# Patient Record
Sex: FEMALE | Race: ASIAN | Employment: FULL TIME | URBAN - METROPOLITAN AREA
[De-identification: names, ages, dates, MRNs, and addresses within clinical notes are randomized per-mention and may not be internally consistent; named-entity substitution may affect disease eponyms.]

---

## 2018-10-01 ENCOUNTER — HOSPITAL ENCOUNTER (OUTPATIENT)
Age: 38
Discharge: HOME | End: 2018-10-01

## 2018-10-01 LAB — RUBELLA: 23.4 IU/ML

## 2018-10-03 LAB
MUMPS IGG TITER: 1.42
VZV IGG SER QL IA: 1.66

## 2018-10-04 LAB — RUBEOLA IGG: > 300 AU/ML

## 2019-01-11 ENCOUNTER — OFFICE VISIT (OUTPATIENT)
Dept: FAMILY MEDICINE CLINIC | Age: 39
End: 2019-01-11
Payer: COMMERCIAL

## 2019-01-11 VITALS
TEMPERATURE: 98 F | RESPIRATION RATE: 12 BRPM | BODY MASS INDEX: 21.71 KG/M2 | WEIGHT: 115 LBS | HEIGHT: 61 IN | SYSTOLIC BLOOD PRESSURE: 122 MMHG | DIASTOLIC BLOOD PRESSURE: 73 MMHG | HEART RATE: 71 BPM

## 2019-01-11 DIAGNOSIS — Z13.220 SCREENING CHOLESTEROL LEVEL: ICD-10-CM

## 2019-01-11 DIAGNOSIS — Z13.1 SCREENING FOR DIABETES MELLITUS: ICD-10-CM

## 2019-01-11 DIAGNOSIS — Z00.00 WELLNESS EXAMINATION: Primary | ICD-10-CM

## 2019-01-11 DIAGNOSIS — Z13.0 SCREENING, ANEMIA, DEFICIENCY, IRON: ICD-10-CM

## 2019-01-11 DIAGNOSIS — Z83.42 FAMILY HISTORY OF HYPERCHOLESTEROLEMIA: ICD-10-CM

## 2019-01-11 PROCEDURE — 90732 PPSV23 VACC 2 YRS+ SUBQ/IM: CPT | Performed by: FAMILY MEDICINE

## 2019-01-11 PROCEDURE — 90471 IMMUNIZATION ADMIN: CPT | Performed by: FAMILY MEDICINE

## 2019-01-11 PROCEDURE — 99385 PREV VISIT NEW AGE 18-39: CPT | Performed by: FAMILY MEDICINE

## 2019-01-11 RX ORDER — ADAPALENE AND BENZOYL PEROXIDE 3; 25 MG/G; MG/G
GEL TOPICAL DAILY
COMMUNITY

## 2019-01-11 RX ORDER — ALBUTEROL SULFATE 90 UG/1
2 AEROSOL, METERED RESPIRATORY (INHALATION) EVERY 6 HOURS PRN
COMMUNITY
End: 2019-01-11 | Stop reason: SDUPTHER

## 2019-01-11 RX ORDER — MINOCYCLINE HYDROCHLORIDE 100 MG/1
100 CAPSULE ORAL DAILY
COMMUNITY
End: 2019-11-17

## 2019-01-11 RX ORDER — CLINDAMYCIN PHOSPHATE 10 MG/G
GEL TOPICAL DAILY
COMMUNITY

## 2019-01-11 RX ORDER — ALBUTEROL SULFATE 90 UG/1
2 AEROSOL, METERED RESPIRATORY (INHALATION) EVERY 6 HOURS PRN
Qty: 3 INHALER | Refills: 1 | Status: SHIPPED | OUTPATIENT
Start: 2019-01-11 | End: 2021-08-17 | Stop reason: SDUPTHER

## 2019-01-11 ASSESSMENT — PATIENT HEALTH QUESTIONNAIRE - PHQ9
1. LITTLE INTEREST OR PLEASURE IN DOING THINGS: 0
SUM OF ALL RESPONSES TO PHQ QUESTIONS 1-9: 0
SUM OF ALL RESPONSES TO PHQ QUESTIONS 1-9: 0
2. FEELING DOWN, DEPRESSED OR HOPELESS: 0
SUM OF ALL RESPONSES TO PHQ9 QUESTIONS 1 & 2: 0

## 2019-01-14 ENCOUNTER — HOSPITAL ENCOUNTER (OUTPATIENT)
Age: 39
Discharge: HOME | End: 2019-01-14
Payer: COMMERCIAL

## 2019-01-14 LAB
BASOPHILS # BLD: 1.1 %
BASOPHILS ABSOLUTE: 0.1 THOU/MM3 (ref 0–0.1)
CHOLESTEROL, TOTAL: 172 MG/DL (ref 100–199)
EOSINOPHIL # BLD: 2.1 %
EOSINOPHILS ABSOLUTE: 0.1 THOU/MM3 (ref 0–0.4)
ERYTHROCYTE [DISTWIDTH] IN BLOOD BY AUTOMATED COUNT: 12.7 % (ref 11.5–14.5)
ERYTHROCYTE [DISTWIDTH] IN BLOOD BY AUTOMATED COUNT: 41.2 FL (ref 35–45)
GLUCOSE BLD-MCNC: 92 MG/DL (ref 70–108)
HCT VFR BLD CALC: 42.2 % (ref 37–47)
HDLC SERPL-MCNC: 56 MG/DL
HEMOGLOBIN: 13.4 GM/DL (ref 12–16)
IMMATURE GRANS (ABS): 0.01 THOU/MM3 (ref 0–0.07)
IMMATURE GRANULOCYTES: 0.2 %
LDL CHOLESTEROL CALCULATED: 103 MG/DL
LYMPHOCYTES # BLD: 26.9 %
LYMPHOCYTES ABSOLUTE: 1.5 THOU/MM3 (ref 1–4.8)
MCH RBC QN AUTO: 28.2 PG (ref 26–33)
MCHC RBC AUTO-ENTMCNC: 31.8 GM/DL (ref 32.2–35.5)
MCV RBC AUTO: 88.8 FL (ref 81–99)
MONOCYTES # BLD: 9.6 %
MONOCYTES ABSOLUTE: 0.5 THOU/MM3 (ref 0.4–1.3)
NUCLEATED RED BLOOD CELLS: 0 /100 WBC
PLATELET # BLD: 283 THOU/MM3 (ref 130–400)
PMV BLD AUTO: 9.7 FL (ref 9.4–12.4)
RBC # BLD: 4.75 MILL/MM3 (ref 4.2–5.4)
SEG NEUTROPHILS: 60.1 %
SEGMENTED NEUTROPHILS ABSOLUTE COUNT: 3.4 THOU/MM3 (ref 1.8–7.7)
TRIGL SERPL-MCNC: 63 MG/DL (ref 0–199)
WBC # BLD: 5.6 THOU/MM3 (ref 4.8–10.8)

## 2019-01-14 PROCEDURE — 36415 COLL VENOUS BLD VENIPUNCTURE: CPT

## 2019-01-14 PROCEDURE — 80061 LIPID PANEL: CPT

## 2019-01-14 PROCEDURE — 82947 ASSAY GLUCOSE BLOOD QUANT: CPT

## 2019-01-14 PROCEDURE — 85025 COMPLETE CBC W/AUTO DIFF WBC: CPT

## 2019-01-15 ENCOUNTER — TELEPHONE (OUTPATIENT)
Dept: FAMILY MEDICINE CLINIC | Age: 39
End: 2019-01-15

## 2019-01-22 ENCOUNTER — OFFICE VISIT (OUTPATIENT)
Dept: FAMILY MEDICINE CLINIC | Age: 39
End: 2019-01-22
Payer: COMMERCIAL

## 2019-01-22 VITALS
HEIGHT: 61 IN | WEIGHT: 113 LBS | RESPIRATION RATE: 14 BRPM | BODY MASS INDEX: 21.34 KG/M2 | HEART RATE: 66 BPM | TEMPERATURE: 98.8 F | SYSTOLIC BLOOD PRESSURE: 105 MMHG | DIASTOLIC BLOOD PRESSURE: 72 MMHG

## 2019-01-22 DIAGNOSIS — J02.9 ACUTE VIRAL PHARYNGITIS: Primary | ICD-10-CM

## 2019-01-22 DIAGNOSIS — R05.9 COUGH: ICD-10-CM

## 2019-01-22 PROCEDURE — 99213 OFFICE O/P EST LOW 20 MIN: CPT | Performed by: NURSE PRACTITIONER

## 2019-11-17 ENCOUNTER — NURSE TRIAGE (OUTPATIENT)
Dept: OTHER | Facility: CLINIC | Age: 39
End: 2019-11-17

## 2019-11-17 ENCOUNTER — HOSPITAL ENCOUNTER (EMERGENCY)
Age: 39
Discharge: HOME | End: 2019-11-17
Attending: EMERGENCY MEDICINE
Payer: COMMERCIAL

## 2019-11-17 VITALS
TEMPERATURE: 98.8 F | HEIGHT: 61 IN | WEIGHT: 110 LBS | SYSTOLIC BLOOD PRESSURE: 121 MMHG | OXYGEN SATURATION: 100 % | HEART RATE: 68 BPM | DIASTOLIC BLOOD PRESSURE: 66 MMHG | BODY MASS INDEX: 20.77 KG/M2 | RESPIRATION RATE: 16 BRPM

## 2019-11-17 DIAGNOSIS — N30.01 ACUTE CYSTITIS WITH HEMATURIA: Primary | ICD-10-CM

## 2019-11-17 LAB
BILIRUBIN URINE: NEGATIVE
BLOOD, URINE: ABNORMAL
CHARACTER, URINE: CLEAR
COLOR: YELLOW
GLUCOSE, URINE: NEGATIVE MG/DL
KETONES, URINE: NEGATIVE
LEUKOCYTES, UA: ABNORMAL
NITRATE, UA: NEGATIVE
PH UA: 5.5 (ref 5–9)
PROTEIN UA: NEGATIVE MG/DL
REFLEX TO URINE C & S: ABNORMAL
SPECIFIC GRAVITY UA: 1.01 (ref 1–1.03)
UROBILINOGEN, URINE: 0.2 EU/DL (ref 0–1)

## 2019-11-17 PROCEDURE — 81003 URINALYSIS AUTO W/O SCOPE: CPT

## 2019-11-17 PROCEDURE — 99213 OFFICE O/P EST LOW 20 MIN: CPT | Performed by: EMERGENCY MEDICINE

## 2019-11-17 PROCEDURE — 99214 OFFICE O/P EST MOD 30 MIN: CPT

## 2019-11-17 PROCEDURE — 87086 URINE CULTURE/COLONY COUNT: CPT

## 2019-11-17 RX ORDER — CIPROFLOXACIN 500 MG/1
500 TABLET, FILM COATED ORAL 2 TIMES DAILY
Qty: 10 TABLET | Refills: 0 | Status: SHIPPED | OUTPATIENT
Start: 2019-11-17 | End: 2019-11-22

## 2019-11-17 ASSESSMENT — ENCOUNTER SYMPTOMS
CHOKING: 0
TROUBLE SWALLOWING: 0
DIARRHEA: 0
WHEEZING: 0
COUGH: 0
EYE DISCHARGE: 0
NAUSEA: 0
BLOOD IN STOOL: 0
VOICE CHANGE: 0
STRIDOR: 0
EYE REDNESS: 0
SORE THROAT: 0
VOMITING: 0
ABDOMINAL PAIN: 1
EYE PAIN: 0
SINUS PRESSURE: 0
CONSTIPATION: 0
BACK PAIN: 0
FACIAL SWELLING: 0
SHORTNESS OF BREATH: 0

## 2019-11-17 ASSESSMENT — PAIN DESCRIPTION - LOCATION: LOCATION: ABDOMEN

## 2019-11-17 ASSESSMENT — PAIN SCALES - GENERAL: PAINLEVEL_OUTOF10: 1

## 2019-11-18 LAB
ORGANISM: ABNORMAL
URINE CULTURE REFLEX: ABNORMAL

## 2019-11-25 ENCOUNTER — TELEPHONE (OUTPATIENT)
Dept: FAMILY MEDICINE CLINIC | Age: 39
End: 2019-11-25

## 2019-12-02 ENCOUNTER — NURSE ONLY (OUTPATIENT)
Dept: FAMILY MEDICINE CLINIC | Age: 39
End: 2019-12-02
Payer: COMMERCIAL

## 2019-12-02 DIAGNOSIS — Z23 NEED FOR INFLUENZA VACCINATION: Primary | ICD-10-CM

## 2019-12-02 PROCEDURE — 90688 IIV4 VACCINE SPLT 0.5 ML IM: CPT | Performed by: FAMILY MEDICINE

## 2019-12-02 PROCEDURE — 90471 IMMUNIZATION ADMIN: CPT | Performed by: FAMILY MEDICINE

## 2020-02-13 ENCOUNTER — OFFICE VISIT (OUTPATIENT)
Dept: FAMILY MEDICINE CLINIC | Age: 40
End: 2020-02-13
Payer: COMMERCIAL

## 2020-02-13 VITALS
HEART RATE: 65 BPM | TEMPERATURE: 98 F | DIASTOLIC BLOOD PRESSURE: 78 MMHG | WEIGHT: 114.6 LBS | HEIGHT: 61 IN | BODY MASS INDEX: 21.64 KG/M2 | SYSTOLIC BLOOD PRESSURE: 115 MMHG

## 2020-02-13 PROCEDURE — 99395 PREV VISIT EST AGE 18-39: CPT | Performed by: FAMILY MEDICINE

## 2020-02-13 RX ORDER — THIAMINE MONONITRATE, RIBOFLAVIN, PYRIDOXINE HYDROCHLORIDE, CYANOCOBALAMIN, ASCORBIC ACID, NIACIN, FOLIC ACID, FERROUS FUMARATE, CALCIUM CARBONATE, CHOLECALCIFEROL, VITAMIN E ACETATE, POTASSIUM IODIDE, ZINC OXIDE, CHOLINE BITARTRATE, AND DOCONEXENT
1 KIT DAILY
Qty: 90 EACH | Refills: 2 | Status: SHIPPED | OUTPATIENT
Start: 2020-02-13 | End: 2020-03-13

## 2020-02-13 SDOH — ECONOMIC STABILITY: TRANSPORTATION INSECURITY
IN THE PAST 12 MONTHS, HAS THE LACK OF TRANSPORTATION KEPT YOU FROM MEDICAL APPOINTMENTS OR FROM GETTING MEDICATIONS?: NO

## 2020-02-13 SDOH — ECONOMIC STABILITY: TRANSPORTATION INSECURITY
IN THE PAST 12 MONTHS, HAS LACK OF TRANSPORTATION KEPT YOU FROM MEETINGS, WORK, OR FROM GETTING THINGS NEEDED FOR DAILY LIVING?: NO

## 2020-02-13 SDOH — ECONOMIC STABILITY: FOOD INSECURITY: WITHIN THE PAST 12 MONTHS, YOU WORRIED THAT YOUR FOOD WOULD RUN OUT BEFORE YOU GOT MONEY TO BUY MORE.: NEVER TRUE

## 2020-02-13 SDOH — ECONOMIC STABILITY: FOOD INSECURITY: WITHIN THE PAST 12 MONTHS, THE FOOD YOU BOUGHT JUST DIDN'T LAST AND YOU DIDN'T HAVE MONEY TO GET MORE.: NEVER TRUE

## 2020-02-13 SDOH — ECONOMIC STABILITY: INCOME INSECURITY: HOW HARD IS IT FOR YOU TO PAY FOR THE VERY BASICS LIKE FOOD, HOUSING, MEDICAL CARE, AND HEATING?: NOT HARD AT ALL

## 2020-02-13 ASSESSMENT — PATIENT HEALTH QUESTIONNAIRE - PHQ9
SUM OF ALL RESPONSES TO PHQ QUESTIONS 1-9: 0
2. FEELING DOWN, DEPRESSED OR HOPELESS: 0
1. LITTLE INTEREST OR PLEASURE IN DOING THINGS: 0
SUM OF ALL RESPONSES TO PHQ9 QUESTIONS 1 & 2: 0
SUM OF ALL RESPONSES TO PHQ QUESTIONS 1-9: 0

## 2020-02-13 NOTE — PROGRESS NOTES
[address]  [address], [address] [facility]  Ph:   [phone]  Fax: [phone]    Provider:  Dr. Angus Garcia Visit    Patient:  Reyes Salaam  YOB: 1980      Visit Date:  2/13/2020    Subjective:  Review of Systems   All other systems reviewed and are negative. Health Habits: With regard to her health habits, she eats 3 meals and 0 snacks per day. She does not exercise regularly:  She does not take over the counter vitamins. She never had a blood transfusion or tattoo. She wears seatbelts while riding a car. She does not text or talk on the phone while driving. She performs all of her ADL's without problem. She is independent, she cooks, drives, bathes, and gets dressed without assistance. She is . She has 1 child. She does work. 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 She is happy with her life. She rates her stress level a 2 in a scale 1-10. Health Maintenance   Topic Date Due    Varicella vaccine (1 of 2 - 2-dose childhood series) 12/25/1981    DTaP/Tdap/Td vaccine (1 - Tdap) 12/25/1991    HIV screen  12/25/1995    Cervical cancer screen  12/25/2001    Shingles Vaccine (1 of 2) 12/25/2030    Flu vaccine  Completed    Hepatitis A vaccine  Aged Out    Hepatitis B vaccine  Aged Out    Hib vaccine  Aged Out    Meningococcal (ACWY) vaccine  Aged Out    Pneumococcal 0-64 years Vaccine  Aged Out       She  reports that she has never smoked. She has never used smokeless tobacco. She reports that she does not drink alcohol or use drugs. .   Her last pap smear was 9  years and it was normal. Her last menstrual cycle was 2weeks ago. She is sexually active. She has had the same sexual partner for the last 13 years. She does not perform regular breast self exams. [address]  [address], [address] [facility]  Ph:   [phone]  Fax: [phone]    Provider:  Dr. Margaret Chacko     HPI:  Hugo Mcknight Kenzie Rivera is a 44y.o. year old female, who comes today for an annual wellness visit. Past Medical History:   Diagnosis Date    Acne teeanger    Asthma     Dx as a child        History reviewed. No pertinent surgical history.     Family History   Problem Relation Age of Onset    Elevated Lipids Mother 48    High Blood Pressure Mother 48    High Blood Pressure Father 48    Heart Attack Father 47        CAD    Elevated Lipids Father 48    Asthma Father     Asthma Sister         dx as a chlid   [facility] Asthma Brother         dx as chlid    Seizures Maternal Grandfather     High Blood Pressure Paternal Grandmother     Breast Cancer Paternal Aunt 52       Social History     Socioeconomic History    Marital status:      Spouse name: Emanual    Number of children: 1    Years of education: 16    Highest education level: Not on file   Occupational History    Occupation: physician   Social Needs    Financial resource strain: Not hard at all   [place] insecurity:     Worry: Never true     Inability: Never true    Transportation needs:     Medical: No     Non-medical: No   Tobacco Use    Smoking status: Never Smoker    Smokeless tobacco: Never Used   Substance and Sexual Activity    Alcohol use: No    Drug use: No    Sexual activity: Yes     Partners: Male     Comment: - 12 years    Lifestyle    Physical activity:     Days per week: Not on file     Minutes per session: Not on file    Stress: Not on file   Relationships    Social connections:     Talks on phone: Not on file     Gets together: Not on file     Attends Jain service: Not on file     Active member of club or organization: Not on file     Attends meetings of clubs or organizations: Not on file     Relationship status: Not on file    Intimate partner violence:     Fear of current or ex partner: Not on file     Emotionally abused: Not on file     Physically abused: Not on file     Forced sexual activity: Not on file Other Topics Concern    Not on file   Social History Narrative    Not on file       No Known Allergies    Ma has a current medication list which includes the following prescription(s): prenatal + dha, clindamycin, adapalene-benzoyl peroxide, and albuterol sulfate hfa. Objective:  Blood pressure 115/78, pulse 65, temperature 98 °F (36.7 °C), temperature source Oral, height 5' 1\" (1.549 m), weight 114 lb 9.6 oz (52 kg). Physical Examination:   General Appearance - alert, well appearing, and in no distress and oriented to person, place, and time  Eyes - pupils equal and reactive, extraocular eye movements intact, sclera anicteric  Ears - bilateral TM's and external ear canals normal, hearing grossly normal bilaterally  Nose - normal and patent, no erythema, discharge or polyps  Mouth - mucous membranes moist, pharynx normal without lesions  Neck - supple, no significant adenopathy  Lymphatics - no palpable lymphadenopathy  Chest - clear to auscultation, no wheezes, rales or rhonchi, symmetric air entry  Heart - normal rate, regular rhythm, normal S1, S2, no murmurs, rubs, clicks or gallops  Abdomen - soft, nontender, nondistended, no masses or organomegaly  Neurological -  oriented, normal speech, no focal findings or movement disorder noted  Extremities - peripheral pulses normal, no pedal edema, no clubbing or cyanosis  Skin - normal coloration and turgor, no rashes, no suspicious skin lesions noted    Assessment:   Diagnosis Orders   1. Wellness examination     2. Screening, lipid  Lipid Panel   3. Screening for diabetes mellitus  Glucose, Fasting   4. Screening for cervical cancer  Pj Avilez MD, Obstetrics & Gynecology, [facility]   5. Desire for pregnancy  Pj Avilez MD, Obstetrics & Gynecology, [address]:    Return in about 1 year (around 2/13/2021) for Wellness Visit. .    Counseling was provided today regarding the following topics:  Healthy eating habits and snacks, talking or

## 2020-02-21 ENCOUNTER — NURSE ONLY (OUTPATIENT)
Dept: LAB | Age: 40
End: 2020-02-21

## 2020-02-21 LAB
CHOLESTEROL, TOTAL: 179 MG/DL (ref 100–199)
GLUCOSE FASTING: 99 MG/DL (ref 70–108)
HDLC SERPL-MCNC: 67 MG/DL
LDL CHOLESTEROL CALCULATED: 103 MG/DL
TRIGL SERPL-MCNC: 45 MG/DL (ref 0–199)

## 2020-03-13 ENCOUNTER — NURSE ONLY (OUTPATIENT)
Dept: LAB | Age: 40
End: 2020-03-13

## 2020-03-13 ENCOUNTER — OFFICE VISIT (OUTPATIENT)
Dept: FAMILY MEDICINE CLINIC | Age: 40
End: 2020-03-13
Payer: COMMERCIAL

## 2020-03-13 VITALS
SYSTOLIC BLOOD PRESSURE: 97 MMHG | BODY MASS INDEX: 21.41 KG/M2 | HEART RATE: 78 BPM | TEMPERATURE: 98.1 F | HEIGHT: 61 IN | DIASTOLIC BLOOD PRESSURE: 71 MMHG | WEIGHT: 113.4 LBS | RESPIRATION RATE: 12 BRPM

## 2020-03-13 LAB
ALBUMIN SERPL-MCNC: 4.3 G/DL (ref 3.5–5.1)
ALP BLD-CCNC: 52 U/L (ref 38–126)
ALT SERPL-CCNC: 14 U/L (ref 11–66)
ANION GAP SERPL CALCULATED.3IONS-SCNC: 13 MEQ/L (ref 8–16)
AST SERPL-CCNC: 17 U/L (ref 5–40)
BACTERIA: ABNORMAL
BASOPHILS # BLD: 1.2 %
BASOPHILS ABSOLUTE: 0.1 THOU/MM3 (ref 0–0.1)
BILIRUB SERPL-MCNC: 0.2 MG/DL (ref 0.3–1.2)
BILIRUBIN URINE: NEGATIVE
BLOOD, URINE: NEGATIVE
BUN BLDV-MCNC: 14 MG/DL (ref 7–22)
CALCIUM SERPL-MCNC: 9.6 MG/DL (ref 8.5–10.5)
CASTS: ABNORMAL /LPF
CASTS: ABNORMAL /LPF
CHARACTER, URINE: CLEAR
CHLORIDE BLD-SCNC: 101 MEQ/L (ref 98–111)
CO2: 25 MEQ/L (ref 23–33)
COLOR: YELLOW
CREAT SERPL-MCNC: 0.6 MG/DL (ref 0.4–1.2)
CRYSTALS: ABNORMAL
EOSINOPHIL # BLD: 8.9 %
EOSINOPHILS ABSOLUTE: 0.7 THOU/MM3 (ref 0–0.4)
EPITHELIAL CELLS, UA: ABNORMAL /HPF
ERYTHROCYTE [DISTWIDTH] IN BLOOD BY AUTOMATED COUNT: 13.2 % (ref 11.5–14.5)
ERYTHROCYTE [DISTWIDTH] IN BLOOD BY AUTOMATED COUNT: 43.8 FL (ref 35–45)
GFR SERPL CREATININE-BSD FRML MDRD: > 90 ML/MIN/1.73M2
GLUCOSE BLD-MCNC: 100 MG/DL (ref 70–108)
GLUCOSE, URINE: NEGATIVE MG/DL
HCT VFR BLD CALC: 44 % (ref 37–47)
HEMOGLOBIN: 14 GM/DL (ref 12–16)
IMMATURE GRANS (ABS): 0.02 THOU/MM3 (ref 0–0.07)
IMMATURE GRANULOCYTES: 0.3 %
KETONES, URINE: ABNORMAL
LEUKOCYTE ESTERASE, URINE: NEGATIVE
LYMPHOCYTES # BLD: 20.2 %
LYMPHOCYTES ABSOLUTE: 1.5 THOU/MM3 (ref 1–4.8)
MCH RBC QN AUTO: 28.8 PG (ref 26–33)
MCHC RBC AUTO-ENTMCNC: 31.8 GM/DL (ref 32.2–35.5)
MCV RBC AUTO: 90.5 FL (ref 81–99)
MISCELLANEOUS LAB TEST RESULT: ABNORMAL
MONOCYTES # BLD: 6.2 %
MONOCYTES ABSOLUTE: 0.5 THOU/MM3 (ref 0.4–1.3)
NITRITE, URINE: NEGATIVE
NUCLEATED RED BLOOD CELLS: 0 /100 WBC
PH UA: 6.5 (ref 5–9)
PLATELET # BLD: 267 THOU/MM3 (ref 130–400)
PMV BLD AUTO: 9.7 FL (ref 9.4–12.4)
POTASSIUM SERPL-SCNC: 4.2 MEQ/L (ref 3.5–5.2)
PROTEIN UA: NEGATIVE MG/DL
RBC # BLD: 4.86 MILL/MM3 (ref 4.2–5.4)
RBC URINE: ABNORMAL /HPF
RENAL EPITHELIAL, UA: ABNORMAL
SEG NEUTROPHILS: 63.2 %
SEGMENTED NEUTROPHILS ABSOLUTE COUNT: 4.8 THOU/MM3 (ref 1.8–7.7)
SODIUM BLD-SCNC: 139 MEQ/L (ref 135–145)
SPECIFIC GRAVITY UA: 1.03 (ref 1–1.03)
TOTAL PROTEIN: 7.8 G/DL (ref 6.1–8)
UROBILINOGEN, URINE: 0.2 EU/DL (ref 0–1)
WBC # BLD: 7.6 THOU/MM3 (ref 4.8–10.8)
WBC UA: ABNORMAL /HPF
YEAST: ABNORMAL

## 2020-03-13 PROCEDURE — 99214 OFFICE O/P EST MOD 30 MIN: CPT | Performed by: FAMILY MEDICINE

## 2020-03-13 RX ORDER — OMEPRAZOLE 20 MG/1
20 CAPSULE, DELAYED RELEASE ORAL 2 TIMES DAILY
Qty: 30 CAPSULE | Refills: 3 | Status: SHIPPED | OUTPATIENT
Start: 2020-03-13

## 2020-03-13 NOTE — PROGRESS NOTES
disturbances  ENT ROS: negative for - headaches, nasal congestion or nasal discharge  Allergy and Immunology ROS: negative for - seasonal allergies  Hematological and Lymphatic ROS: negative for - bruising  Endocrine ROS: negative for - malaise/lethargy, polydipsia/polyuria or temperature intolerance  Respiratory ROS: negative for - cough,  shortness of breath or wheezing  Cardiovascular ROS: negative for - chest pain or edema  Gastrointestinal ROS: negative for - abdominal pain, constipation, diarrhea or nausea/vomiting  Musculoskeletal ROS: negative for - joint pain or muscle pain  Neurological ROS: negative for - dizziness  Dermatological ROS: negative for - rash    Physical Examination:  BP 97/71 (Site: Left Upper Arm, Position: Sitting, Cuff Size: Medium Adult)   Pulse 78   Temp 98.1 °F (36.7 °C) (Oral)   Resp 12   Ht 5' 1\" (1.549 m)   Wt 113 lb 6.4 oz (51.4 kg)   BMI 21.43 kg/m²     General-  Alert and oriented x 3, NAD  HEENT: NC, AT  Ears: Normal tympanic membranes bilaterally  Nose: patent, no lesions  Mouth: no lesions, moist mucosas  Neck - supple, no significant adenopathy  Chest - clear to auscultation, no wheezes, rales or rhonchi, symmetric air entry  Heart - normal rate, regular rhythm, normal S1, S2, no murmurs, rubs, clicks or gallops  Abdomen - soft, mild tenderness in the periumbilical, left upper quadrand area. Nondistended, no masses or organomegaly      Impression:   Diagnosis Orders   1. Other acute gastritis without hemorrhage  CBC Auto Differential    Comprehensive Metabolic Panel   2. Other microscopic hematuria  Urinalysis with Microscopic       Plan:    Continue Prilosec 40 mg PO daily  Do blood test  Stop Prilosec in 4-6 weeks and follow up 2 weeks later. If pain still present will do stool H pylori Ag.   Repeat urine with microscopy      Orders Placed This Encounter   Procedures    CBC Auto Differential     Standing Status:   Future     Number of Occurrences:   1     Standing Expiration Date:   3/13/2021    Comprehensive Metabolic Panel     Standing Status:   Future     Number of Occurrences:   1     Standing Expiration Date:   3/13/2021    Urinalysis with Microscopic     Standing Status:   Future     Number of Occurrences:   1     Standing Expiration Date:   3/13/2021     Order Specific Question:   SPECIFY(EX-CATH,MIDSTREAM,CYSTO,ETC)? Answer:   midstream     Orders Placed This Encounter   Medications    omeprazole (PRILOSEC) 20 MG delayed release capsule     Sig: Take 1 capsule by mouth 2 times daily     Dispense:  30 capsule     Refill:  3       Follow Up:  Return in about 6 weeks (around 4/24/2020).     Zeinab Kitchen MD

## 2020-03-16 ENCOUNTER — TELEPHONE (OUTPATIENT)
Dept: FAMILY MEDICINE CLINIC | Age: 40
End: 2020-03-16

## 2020-04-04 ENCOUNTER — NURSE TRIAGE (OUTPATIENT)
Dept: OTHER | Facility: CLINIC | Age: 40
End: 2020-04-04

## 2020-04-05 ENCOUNTER — HOSPITAL ENCOUNTER (EMERGENCY)
Age: 40
Discharge: HOME | End: 2020-04-05
Attending: EMERGENCY MEDICINE
Payer: COMMERCIAL

## 2020-04-05 VITALS
TEMPERATURE: 98.4 F | BODY MASS INDEX: 21.71 KG/M2 | OXYGEN SATURATION: 100 % | WEIGHT: 115 LBS | SYSTOLIC BLOOD PRESSURE: 124 MMHG | HEIGHT: 61 IN | DIASTOLIC BLOOD PRESSURE: 79 MMHG | HEART RATE: 70 BPM | RESPIRATION RATE: 15 BRPM

## 2020-04-05 LAB
FLU A ANTIGEN: NEGATIVE
FLU B ANTIGEN: NEGATIVE
GROUP A STREP CULTURE, REFLEX: NEGATIVE
REFLEX THROAT C + S: NORMAL

## 2020-04-05 PROCEDURE — 87880 STREP A ASSAY W/OPTIC: CPT

## 2020-04-05 PROCEDURE — 87804 INFLUENZA ASSAY W/OPTIC: CPT

## 2020-04-05 PROCEDURE — 6370000000 HC RX 637 (ALT 250 FOR IP): Performed by: EMERGENCY MEDICINE

## 2020-04-05 PROCEDURE — 87070 CULTURE OTHR SPECIMN AEROBIC: CPT

## 2020-04-05 PROCEDURE — 99283 EMERGENCY DEPT VISIT LOW MDM: CPT

## 2020-04-05 RX ORDER — ACETAMINOPHEN 500 MG
1000 TABLET ORAL ONCE
Status: COMPLETED | OUTPATIENT
Start: 2020-04-05 | End: 2020-04-05

## 2020-04-05 RX ORDER — ACETAMINOPHEN 500 MG
1000 TABLET ORAL EVERY 6 HOURS PRN
Qty: 20 TABLET | Refills: 0 | Status: SHIPPED | OUTPATIENT
Start: 2020-04-05

## 2020-04-05 RX ADMIN — Medication 3.3 MG: at 12:39

## 2020-04-05 RX ADMIN — ACETAMINOPHEN 1000 MG: 500 TABLET ORAL at 12:39

## 2020-04-05 ASSESSMENT — ENCOUNTER SYMPTOMS
SINUS PAIN: 0
BACK PAIN: 0
SINUS PRESSURE: 0
SHORTNESS OF BREATH: 0
EYE PAIN: 0
BLOOD IN STOOL: 0
COUGH: 0
SORE THROAT: 1
ABDOMINAL PAIN: 0
CONSTIPATION: 0
RECTAL PAIN: 0
NAUSEA: 0
DIARRHEA: 0
CHEST TIGHTNESS: 0

## 2020-04-05 ASSESSMENT — PAIN DESCRIPTION - PAIN TYPE: TYPE: ACUTE PAIN

## 2020-04-05 ASSESSMENT — PAIN DESCRIPTION - DESCRIPTORS: DESCRIPTORS: ACHING

## 2020-04-05 ASSESSMENT — PAIN SCALES - GENERAL
PAINLEVEL_OUTOF10: 5
PAINLEVEL_OUTOF10: 5

## 2020-04-05 ASSESSMENT — PAIN DESCRIPTION - LOCATION: LOCATION: GENERALIZED

## 2020-04-05 ASSESSMENT — PAIN DESCRIPTION - FREQUENCY: FREQUENCY: CONTINUOUS

## 2020-04-05 NOTE — ED PROVIDER NOTES
negative. PAST MEDICAL AND SURGICAL HISTORY     Past Medical History:   Diagnosis Date    Acne teeanger    Asthma     Dx as a child      History reviewed. No pertinent surgical history. MEDICATIONS     Current Facility-Administered Medications:     acetaminophen (TYLENOL) tablet 1,000 mg, 1,000 mg, Oral, Once, Armen Agosto MD    menthol [facility]) lozenge 3.3 mg, 1 lozenge, Mouth/Throat, Once, Armen Agosto MD    Current Outpatient Medications:     acetaminophen (TYLENOL) 500 MG tablet, Take 2 tablets by mouth every 6 hours as needed for Pain, Disp: 20 tablet, Rfl: 0    benzocaine-menthol (CEPACOL SORE THROAT) 15-3.6 MG lozenge, Take 1 lozenge by mouth every 2 hours as needed for Sore Throat, Disp: 168 lozenge, Rfl: 0    omeprazole (PRILOSEC) 20 MG delayed release capsule, Take 1 capsule by mouth 2 times daily, Disp: 30 capsule, Rfl: 3    clindamycin (CLINDAGEL) 1 % gel, Apply topically daily Apply topically 2 times daily. 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

## 2020-04-05 NOTE — ED NOTES
Presents to ER for complaints of fever, body aches and diarrhea. Pt states she started to have body aches that began one week ago, states the diarrhea began 4 days ago and the fever started last night. States she has taken tylenol and advil with relief of fever but no relief of body aches. States the body aches have only began getting worse, rates pain a 5 out of 10 in severity. 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 Pt states she has a history of asthma, denies taking any home medications. Alert and oriented, respirations unlabored. Dr. Wiley Espinoza at bedside for patient evaluation.      Soledad Pruett RN  04/05/20 5797

## 2020-04-06 ENCOUNTER — TELEPHONE (OUTPATIENT)
Dept: FAMILY MEDICINE CLINIC | Age: 40
End: 2020-04-06

## 2020-04-07 LAB — THROAT/NOSE CULTURE: NORMAL

## 2020-04-13 ENCOUNTER — OFFICE VISIT (OUTPATIENT)
Dept: PRIMARY CARE CLINIC | Age: 40
End: 2020-04-13
Payer: COMMERCIAL

## 2020-04-13 VITALS
DIASTOLIC BLOOD PRESSURE: 72 MMHG | SYSTOLIC BLOOD PRESSURE: 129 MMHG | HEART RATE: 97 BPM | TEMPERATURE: 99.4 F | RESPIRATION RATE: 14 BRPM | OXYGEN SATURATION: 99 %

## 2020-04-13 PROCEDURE — 99213 OFFICE O/P EST LOW 20 MIN: CPT | Performed by: NURSE PRACTITIONER

## 2020-04-13 ASSESSMENT — ENCOUNTER SYMPTOMS
SORE THROAT: 1
GASTROINTESTINAL NEGATIVE: 1
RESPIRATORY NEGATIVE: 1
EYES NEGATIVE: 1

## 2020-04-13 NOTE — PROGRESS NOTES
Evelio Wilkerson is a 44 y.o. female whopresents today for :  Chief Complaint   Patient presents with    Diarrhea     diarrhea since 3/29/20    Pharyngitis     sore throat started 4/1/20    Fever     felt feverish started last Sat     Generalized Body Aches     all over body aches        HPI:     HPI  As above. 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 Before returning to work she needs negative covid testing. For the most part she is feeling better. Still slight sore throat. There is no problem list on file for this patient. Past Medical History:   Diagnosis Date    Acne teeanger    Asthma     Dx as a child       No past surgical history on file. Family History   Problem Relation Age of Onset    Elevated Lipids Mother 48    High Blood Pressure Mother 48    High Blood Pressure Father 48    Heart Attack Father 47        CAD    Elevated Lipids Father 48    Asthma Father     Asthma Sister         dx as a chlid    Asthma Brother         dx as chlid    Seizures Maternal Grandfather     High Blood Pressure Paternal Grandmother     Breast Cancer Paternal Aunt 52     Social History     Tobacco Use    Smoking status: Never Smoker    Smokeless tobacco: Never Used   Substance Use Topics    Alcohol use: No      Current Outpatient Medications   Medication Sig Dispense Refill    acetaminophen (TYLENOL) 500 MG tablet Take 2 tablets by mouth every 6 hours as needed for Pain 20 tablet 0    omeprazole (PRILOSEC) 20 MG delayed release capsule Take 1 capsule by mouth 2 times daily 30 capsule 3    clindamycin (CLINDAGEL) 1 % gel Apply topically daily Apply topically 2 times daily.       Adapalene-Benzoyl Peroxide (EPIDUO FORTE) 0.3-2.5 % GEL Apply topically daily      albuterol sulfate  (90 Base) MCG/ACT inhaler Inhale 2 puffs into the lungs every 6 hours as needed for Wheezing 3 Inhaler 1     No current facility-administered medications for this visit. No Known Allergies  Health Maintenance   Topic Date Due    Varicella vaccine (1 of 2 - 2-dose childhood series) 12/25/1981    HIV screen  12/25/1995    DTaP/Tdap/Td vaccine (1 - Tdap) 12/25/1999    Cervical cancer screen  12/25/2001    Flu vaccine  Completed    Hepatitis A vaccine  Aged Out    Hepatitis B vaccine  Aged Out    Hib vaccine  Aged Out    Meningococcal (ACWY) vaccine  Aged Out    Pneumococcal 0-64 years Vaccine  Aged Out       Subjective:     Review of Systems   Constitutional: Negative. HENT: Positive for sore throat. Eyes: Negative. Respiratory: Negative. Cardiovascular: Negative. Gastrointestinal: Negative. Musculoskeletal: Negative. Skin: Negative. Neurological: Negative. Objective:     Vitals:    04/13/20 1306   BP: 129/72   Pulse: 97   Resp: 14   Temp: 99.4 °F (37.4 °C)   TempSrc: Oral   SpO2: 99%       Physical Exam  Constitutional:       Appearance: She is well-developed. HENT:      Head: Normocephalic. Right Ear: Tympanic membrane and external ear normal.      Left Ear: Tympanic membrane and external ear normal.      Nose: Nose normal.   Neck:      Musculoskeletal: Normal range of motion and neck supple. Cardiovascular:      Rate and Rhythm: Normal rate and regular rhythm. Heart sounds: Normal heart sounds. No murmur. No friction rub. No gallop. Pulmonary:      Effort: Pulmonary effort is normal.      Breath sounds: Normal breath sounds. No wheezing or rales. Abdominal:      General: Bowel sounds are normal.      Palpations: Abdomen is soft. Tenderness: There is no abdominal tenderness. There is no guarding. Musculoskeletal: Normal range of motion. Lymphadenopathy:      Cervical: No cervical adenopathy. Skin:     General: Skin is warm. Neurological:      Mental Status: She is alert and oriented to person, place, and time. Deep Tendon Reflexes: Reflexes are normal and symmetric.

## 2020-04-18 LAB
PERFORMING LAB: NORMAL
REPORT: NORMAL
SARS-COV-2: NOT DETECTED

## 2020-04-28 ENCOUNTER — VIRTUAL VISIT (OUTPATIENT)
Dept: FAMILY MEDICINE CLINIC | Age: 40
End: 2020-04-28

## 2020-04-28 PROCEDURE — 99213 OFFICE O/P EST LOW 20 MIN: CPT | Performed by: FAMILY MEDICINE

## 2020-04-28 NOTE — PROGRESS NOTES
Appears well-developed and well-nourished [x] No apparent distress      [] Abnormal-   Mental status  [x] Alert and awake  [x] Oriented to person/place/time [x]Able to follow commands      Eyes:  EOM    [x]  Normal  [] Abnormal-  Sclera  [x]  Normal  [] Abnormal -         Discharge [x]  None visible  [] Abnormal -    HENT:   [x] Normocephalic, atraumatic. [] Abnormal   [] Mouth/Throat: Mucous membranes are moist.     External Ears [] Normal  [] Abnormal-     Neck: [x] No visualized mass     Pulmonary/Chest: [x] Respiratory effort normal.  [x] No visualized signs of difficulty breathing or respiratory distress        [] Abnormal-      Musculoskeletal:   [x] Normal gait with no signs of ataxia         [x] Normal range of motion of neck        [] Abnormal-       Neurological:        [x] No Facial Asymmetry (Cranial nerve 7 motor function) (limited exam to video visit)          [] No gaze palsy        [] Abnormal-         Skin:        [x] No significant exanthematous lesions or discoloration noted on facial skin         [] Abnormal-            Psychiatric:       [x] Normal Affect [x] No Hallucinations        [] Abnormal-         ASSESSMENT/PLAN:  1. Other acute gastritis without hemorrhage  Decrease Prilosec to 20 mg 1 tablet by mouth daily. If she remains asymptomatic for another month on the Prilosec 20 mg every day she can stop the medication. Return in about 3 months (around 7/28/2020). Stefania Meza is a 44 y.o. female being evaluated by a Virtual Visit (video visit) encounter to address concerns as mentioned above. A caregiver was present when appropriate. Due to this being a TeleHealth encounter (During ZCFUP-41 public health emergency), evaluation of the following organ systems was limited: Vitals/Constitutional/EENT/Resp/CV/GI//MS/Neuro/Skin/Heme-Lymph-Imm.   Pursuant to the emergency declaration under the [facility], [facility] waiver authority and the Coronavirus Preparedness and Response Supplemental Appropriations Act, this Virtual Visit was conducted with patient's (and/or legal guardian's) consent, to reduce the patient's risk of exposure to COVID-19 and provide necessary medical care. The patient (and/or legal guardian) has also been advised to contact this office for worsening conditions or problems, and seek emergency medical treatment and/or call 911 if deemed necessary. Patient identification was verified at the start of the visit: Yes    Total time spent on this encounter: Not billed by time    Services were provided through a video synchronous discussion virtually to substitute for in-person clinic visit. Patient and provider were located at their individual homes. --Zeus Fink MD on 4/28/2020 at 11:52 AM    An electronic signature was used to authenticate this note.

## 2020-08-07 RX ORDER — CLOBETASOL PROPIONATE 0.5 MG/G
OINTMENT TOPICAL
Qty: 45 G | Refills: 0 | Status: SHIPPED | OUTPATIENT
Start: 2020-08-07

## 2020-08-07 RX ORDER — TRETINOIN 0.5 MG/G
CREAM TOPICAL
Qty: 45 G | Refills: 1 | Status: SHIPPED | OUTPATIENT
Start: 2020-08-07 | End: 2020-09-06

## 2020-12-07 LAB — SARS-COV-2: DETECTED

## 2020-12-08 ENCOUNTER — VIRTUAL VISIT (OUTPATIENT)
Dept: FAMILY MEDICINE CLINIC | Age: 40
End: 2020-12-08

## 2020-12-08 PROCEDURE — 99213 OFFICE O/P EST LOW 20 MIN: CPT | Performed by: FAMILY MEDICINE

## 2020-12-08 RX ORDER — FLUTICASONE PROPIONATE 50 MCG
2 SPRAY, SUSPENSION (ML) NASAL DAILY
Qty: 1 BOTTLE | Refills: 0 | Status: SHIPPED | OUTPATIENT
Start: 2020-12-08

## 2020-12-08 RX ORDER — GUAIFENESIN 600 MG/1
1200 TABLET, EXTENDED RELEASE ORAL 2 TIMES DAILY
Qty: 40 TABLET | Refills: 0 | Status: SHIPPED | OUTPATIENT
Start: 2020-12-08 | End: 2020-12-18

## 2020-12-08 NOTE — PROGRESS NOTES
2020    TELEHEALTH EVALUATION -- Audio/Visual (During SZOER-95 public health emergency)    HPI:    Maine Carr (:  1980) has requested an audio/video evaluation for the following concern(s): Positive Covid    Wednesday she startied having daily headaches. Friday, intermittent sore throat, by Saturday feverish 98.4   started dry cough and felt cold, having gossebump    night she loss her smell sense. Monday her temperature was 100.6 and bad body aching; she was tested and she got called in 4 hours because her test was positive. [employer] called her to let her know the test was positive and she needed to quarantine. 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 Positive for loss of smell and taste senses. Positive for body aching. Negative for nausea, vomiting, diarrhea. Prior to Visit Medications    Medication Sig Taking? Authorizing Provider   budesonide (PULMICORT FLEXHALER) 180 MCG/ACT AEPB inhaler Inhale 2 puffs into the lungs 2 times daily Yes Renea Dee MD   Spacer/Aero-Holding Chambers VALERIY 1 Device by Does not apply route daily Yes Renea Dee MD   fluticasone (FLONASE) 50 MCG/ACT nasal spray 2 sprays by Each Nostril route daily Yes Renea Dee MD   guaiFENesin (MUCINEX) 600 MG extended release tablet Take 2 tablets by mouth 2 times daily for 10 days Yes Renea Dee MD   clobetasol (TEMOVATE) 0.05 % ointment Apply topically 2 times daily. Renea Dee MD   acetaminophen (TYLENOL) 500 MG tablet Take 2 tablets by mouth every 6 hours as needed for Pain  Leti Kramer MD   omeprazole (PRILOSEC) 20 MG delayed release capsule Take 1 capsule by mouth 2 times daily  Renea Dee MD   clindamycin (CLINDAGEL) 1 % gel Apply topically daily Apply topically 2 times daily.   Historical Provider, MD   Adapalene-Benzoyl Peroxide (EPIDUO FORTE) 0.3-2.5 % GEL Apply topically daily  Historical emergency), evaluation of the following organ systems was limited: Vitals/Constitutional/EENT/Resp/CV/GI//MS/Neuro/Skin/Heme-Lymph-Imm. Pursuant to the emergency declaration under the [facility], [address] [place] [address] authority and the [employer] Act, this Virtual Visit was conducted with patient's (and/or legal guardian's) consent, to reduce the patient's risk of exposure to COVID-19 and provide necessary medical care. The patient (and/or legal guardian) has also been advised to contact this office for worsening conditions or problems, and seek emergency medical treatment and/or call 911 if deemed necessary. Patient identification was verified at the start of the visit: Yes    Total time spent on this encounter: Not billed by time    Services were provided through a video synchronous discussion virtually to substitute for in-person clinic visit. Patient and provider were located at their individual homes. --Radha Sahu MD on 12/10/2020 at 4:00 PM    An electronic signature was used to authenticate this note.

## 2020-12-10 ENCOUNTER — HOSPITAL ENCOUNTER (OUTPATIENT)
Age: 40
Discharge: HOME | End: 2020-12-11
Attending: FAMILY MEDICINE | Admitting: INTERNAL MEDICINE
Payer: COMMERCIAL

## 2020-12-10 ENCOUNTER — APPOINTMENT (OUTPATIENT)
Dept: INTERVENTIONAL RADIOLOGY/VASCULAR | Age: 40
End: 2020-12-10
Payer: COMMERCIAL

## 2020-12-10 ENCOUNTER — APPOINTMENT (OUTPATIENT)
Dept: GENERAL RADIOLOGY | Age: 40
End: 2020-12-10
Payer: COMMERCIAL

## 2020-12-10 PROBLEM — U07.1 COVID-19: Status: ACTIVE | Noted: 2020-12-10

## 2020-12-10 LAB
ABO: NORMAL
ALBUMIN SERPL-MCNC: 4.8 G/DL (ref 3.5–5.1)
ALP BLD-CCNC: 51 U/L (ref 38–126)
ALT SERPL-CCNC: 16 U/L (ref 11–66)
ANION GAP SERPL CALCULATED.3IONS-SCNC: 16 MEQ/L (ref 8–16)
AST SERPL-CCNC: 24 U/L (ref 5–40)
BASOPHILS # BLD: 0.2 %
BASOPHILS ABSOLUTE: 0 THOU/MM3 (ref 0–0.1)
BILIRUB SERPL-MCNC: 0.2 MG/DL (ref 0.3–1.2)
BUN BLDV-MCNC: 10 MG/DL (ref 7–22)
CALCIUM SERPL-MCNC: 9.6 MG/DL (ref 8.5–10.5)
CHLORIDE BLD-SCNC: 97 MEQ/L (ref 98–111)
CO2: 25 MEQ/L (ref 23–33)
CREAT SERPL-MCNC: 0.7 MG/DL (ref 0.4–1.2)
D-DIMER QUANTITATIVE: 253 NG/ML FEU (ref 0–500)
EKG ATRIAL RATE: 112 BPM
EKG P AXIS: 73 DEGREES
EKG P-R INTERVAL: 132 MS
EKG Q-T INTERVAL: 306 MS
EKG QRS DURATION: 78 MS
EKG QTC CALCULATION (BAZETT): 417 MS
EKG R AXIS: 59 DEGREES
EKG T AXIS: 9 DEGREES
EKG VENTRICULAR RATE: 112 BPM
EOSINOPHIL # BLD: 0 %
EOSINOPHILS ABSOLUTE: 0 THOU/MM3 (ref 0–0.4)
ERYTHROCYTE [DISTWIDTH] IN BLOOD BY AUTOMATED COUNT: 12.9 % (ref 11.5–14.5)
ERYTHROCYTE [DISTWIDTH] IN BLOOD BY AUTOMATED COUNT: 41.8 FL (ref 35–45)
GFR SERPL CREATININE-BSD FRML MDRD: > 90 ML/MIN/1.73M2
GLUCOSE BLD-MCNC: 96 MG/DL (ref 70–108)
HCT VFR BLD CALC: 44.9 % (ref 37–47)
HEMOGLOBIN: 14.9 GM/DL (ref 12–16)
IMMATURE GRANS (ABS): 0.01 THOU/MM3 (ref 0–0.07)
IMMATURE GRANULOCYTES: 0.2 %
LACTIC ACID: 0.8 MMOL/L (ref 0.5–2.2)
LYMPHOCYTES # BLD: 15.2 %
LYMPHOCYTES ABSOLUTE: 0.7 THOU/MM3 (ref 1–4.8)
MAGNESIUM: 2 MG/DL (ref 1.6–2.4)
MCH RBC QN AUTO: 29.2 PG (ref 26–33)
MCHC RBC AUTO-ENTMCNC: 33.2 GM/DL (ref 32.2–35.5)
MCV RBC AUTO: 87.9 FL (ref 81–99)
MONOCYTES # BLD: 8.9 %
MONOCYTES ABSOLUTE: 0.4 THOU/MM3 (ref 0.4–1.3)
NUCLEATED RED BLOOD CELLS: 0 /100 WBC
OSMOLALITY CALCULATION: 274.6 MOSMOL/KG (ref 275–300)
PLATELET # BLD: 214 THOU/MM3 (ref 130–400)
PMV BLD AUTO: 9.3 FL (ref 9.4–12.4)
POTASSIUM REFLEX MAGNESIUM: 3 MEQ/L (ref 3.5–5.2)
POTASSIUM SERPL-SCNC: 3.2 MEQ/L (ref 3.5–5.2)
POTASSIUM SERPL-SCNC: 3.5 MEQ/L (ref 3.5–5.2)
PREGNANCY, SERUM: NEGATIVE
PROCALCITONIN: 0.2 NG/ML (ref 0.01–0.09)
RBC # BLD: 5.11 MILL/MM3 (ref 4.2–5.4)
RH FACTOR: NORMAL
SEG NEUTROPHILS: 75.5 %
SEGMENTED NEUTROPHILS ABSOLUTE COUNT: 3.7 THOU/MM3 (ref 1.8–7.7)
SODIUM BLD-SCNC: 138 MEQ/L (ref 135–145)
TOTAL PROTEIN: 8.7 G/DL (ref 6.1–8)
TROPONIN T: < 0.01 NG/ML
WBC # BLD: 4.9 THOU/MM3 (ref 4.8–10.8)

## 2020-12-10 PROCEDURE — 99226 PR SBSQ OBSERVATION CARE/DAY 35 MINUTES: CPT | Performed by: INTERNAL MEDICINE

## 2020-12-10 PROCEDURE — 6360000002 HC RX W HCPCS: Performed by: FAMILY MEDICINE

## 2020-12-10 PROCEDURE — 86900 BLOOD TYPING SEROLOGIC ABO: CPT

## 2020-12-10 PROCEDURE — 71045 X-RAY EXAM CHEST 1 VIEW: CPT

## 2020-12-10 PROCEDURE — 84484 ASSAY OF TROPONIN QUANT: CPT

## 2020-12-10 PROCEDURE — 2580000003 HC RX 258: Performed by: INTERNAL MEDICINE

## 2020-12-10 PROCEDURE — 85025 COMPLETE CBC W/AUTO DIFF WBC: CPT

## 2020-12-10 PROCEDURE — 99284 EMERGENCY DEPT VISIT MOD MDM: CPT

## 2020-12-10 PROCEDURE — 86901 BLOOD TYPING SEROLOGIC RH(D): CPT

## 2020-12-10 PROCEDURE — 84145 PROCALCITONIN (PCT): CPT

## 2020-12-10 PROCEDURE — 84132 ASSAY OF SERUM POTASSIUM: CPT

## 2020-12-10 PROCEDURE — 80053 COMPREHEN METABOLIC PANEL: CPT

## 2020-12-10 PROCEDURE — 83605 ASSAY OF LACTIC ACID: CPT

## 2020-12-10 PROCEDURE — 6370000000 HC RX 637 (ALT 250 FOR IP): Performed by: INTERNAL MEDICINE

## 2020-12-10 PROCEDURE — 94640 AIRWAY INHALATION TREATMENT: CPT

## 2020-12-10 PROCEDURE — 85379 FIBRIN DEGRADATION QUANT: CPT

## 2020-12-10 PROCEDURE — 93005 ELECTROCARDIOGRAM TRACING: CPT | Performed by: FAMILY MEDICINE

## 2020-12-10 PROCEDURE — 84703 CHORIONIC GONADOTROPIN ASSAY: CPT

## 2020-12-10 PROCEDURE — 36415 COLL VENOUS BLD VENIPUNCTURE: CPT

## 2020-12-10 PROCEDURE — 83735 ASSAY OF MAGNESIUM: CPT

## 2020-12-10 PROCEDURE — 93970 EXTREMITY STUDY: CPT

## 2020-12-10 PROCEDURE — 2580000003 HC RX 258: Performed by: FAMILY MEDICINE

## 2020-12-10 PROCEDURE — 93010 ELECTROCARDIOGRAM REPORT: CPT | Performed by: NUCLEAR MEDICINE

## 2020-12-10 PROCEDURE — 87040 BLOOD CULTURE FOR BACTERIA: CPT

## 2020-12-10 PROCEDURE — 6360000002 HC RX W HCPCS: Performed by: INTERNAL MEDICINE

## 2020-12-10 RX ORDER — TRAMADOL HYDROCHLORIDE 50 MG/1
50 TABLET ORAL EVERY 6 HOURS PRN
Status: DISCONTINUED | OUTPATIENT
Start: 2020-12-10 | End: 2020-12-11 | Stop reason: HOSPADM

## 2020-12-10 RX ORDER — LACTOBACILLUS RHAMNOSUS GG 10B CELL
1 CAPSULE ORAL
Status: DISCONTINUED | OUTPATIENT
Start: 2020-12-11 | End: 2020-12-11 | Stop reason: HOSPADM

## 2020-12-10 RX ORDER — PANTOPRAZOLE SODIUM 40 MG/1
40 TABLET, DELAYED RELEASE ORAL
Status: DISCONTINUED | OUTPATIENT
Start: 2020-12-10 | End: 2020-12-11 | Stop reason: HOSPADM

## 2020-12-10 RX ORDER — KETOROLAC TROMETHAMINE 30 MG/ML
15 INJECTION, SOLUTION INTRAMUSCULAR; INTRAVENOUS ONCE
Status: COMPLETED | OUTPATIENT
Start: 2020-12-10 | End: 2020-12-10

## 2020-12-10 RX ORDER — SODIUM CHLORIDE 0.9 % (FLUSH) 0.9 %
10 SYRINGE (ML) INJECTION PRN
Status: DISCONTINUED | OUTPATIENT
Start: 2020-12-10 | End: 2020-12-11 | Stop reason: HOSPADM

## 2020-12-10 RX ORDER — ACETAMINOPHEN 325 MG/1
650 TABLET ORAL EVERY 6 HOURS PRN
Status: DISCONTINUED | OUTPATIENT
Start: 2020-12-10 | End: 2020-12-11 | Stop reason: HOSPADM

## 2020-12-10 RX ORDER — FLUTICASONE PROPIONATE 50 MCG
2 SPRAY, SUSPENSION (ML) NASAL DAILY
Status: DISCONTINUED | OUTPATIENT
Start: 2020-12-10 | End: 2020-12-11 | Stop reason: HOSPADM

## 2020-12-10 RX ORDER — ASCORBIC ACID 500 MG
1000 TABLET ORAL DAILY
Status: DISCONTINUED | OUTPATIENT
Start: 2020-12-10 | End: 2020-12-11 | Stop reason: HOSPADM

## 2020-12-10 RX ORDER — ONDANSETRON 2 MG/ML
4 INJECTION INTRAMUSCULAR; INTRAVENOUS EVERY 6 HOURS PRN
Status: DISCONTINUED | OUTPATIENT
Start: 2020-12-10 | End: 2020-12-11 | Stop reason: HOSPADM

## 2020-12-10 RX ORDER — VITAMIN B COMPLEX
1000 TABLET ORAL DAILY
Status: DISCONTINUED | OUTPATIENT
Start: 2020-12-10 | End: 2020-12-11 | Stop reason: HOSPADM

## 2020-12-10 RX ORDER — ALBUTEROL SULFATE 90 UG/1
2 AEROSOL, METERED RESPIRATORY (INHALATION) EVERY 6 HOURS PRN
Status: DISCONTINUED | OUTPATIENT
Start: 2020-12-10 | End: 2020-12-11 | Stop reason: HOSPADM

## 2020-12-10 RX ORDER — SODIUM CHLORIDE 9 MG/ML
INJECTION, SOLUTION INTRAVENOUS CONTINUOUS
Status: DISCONTINUED | OUTPATIENT
Start: 2020-12-10 | End: 2020-12-11 | Stop reason: HOSPADM

## 2020-12-10 RX ORDER — FLUTICASONE PROPIONATE 110 UG/1
1 AEROSOL, METERED RESPIRATORY (INHALATION) 2 TIMES DAILY
Status: DISCONTINUED | OUTPATIENT
Start: 2020-12-10 | End: 2020-12-11 | Stop reason: HOSPADM

## 2020-12-10 RX ORDER — POLYETHYLENE GLYCOL 3350 17 G/17G
17 POWDER, FOR SOLUTION ORAL DAILY PRN
Status: DISCONTINUED | OUTPATIENT
Start: 2020-12-10 | End: 2020-12-11 | Stop reason: HOSPADM

## 2020-12-10 RX ORDER — ZINC SULFATE 50(220)MG
50 CAPSULE ORAL 2 TIMES DAILY
Status: DISCONTINUED | OUTPATIENT
Start: 2020-12-10 | End: 2020-12-11 | Stop reason: HOSPADM

## 2020-12-10 RX ORDER — GUAIFENESIN 600 MG/1
1200 TABLET, EXTENDED RELEASE ORAL 2 TIMES DAILY
Status: DISCONTINUED | OUTPATIENT
Start: 2020-12-10 | End: 2020-12-11 | Stop reason: HOSPADM

## 2020-12-10 RX ORDER — 0.9 % SODIUM CHLORIDE 0.9 %
20 INTRAVENOUS SOLUTION INTRAVENOUS ONCE
Status: DISCONTINUED | OUTPATIENT
Start: 2020-12-10 | End: 2020-12-11 | Stop reason: HOSPADM

## 2020-12-10 RX ORDER — SODIUM CHLORIDE 0.9 % (FLUSH) 0.9 %
10 SYRINGE (ML) INJECTION EVERY 12 HOURS SCHEDULED
Status: DISCONTINUED | OUTPATIENT
Start: 2020-12-10 | End: 2020-12-11 | Stop reason: HOSPADM

## 2020-12-10 RX ORDER — POTASSIUM CHLORIDE 20 MEQ/1
40 TABLET, EXTENDED RELEASE ORAL PRN
Status: DISCONTINUED | OUTPATIENT
Start: 2020-12-10 | End: 2020-12-11 | Stop reason: HOSPADM

## 2020-12-10 RX ORDER — 0.9 % SODIUM CHLORIDE 0.9 %
30 INTRAVENOUS SOLUTION INTRAVENOUS ONCE
Status: COMPLETED | OUTPATIENT
Start: 2020-12-10 | End: 2020-12-10

## 2020-12-10 RX ORDER — TRAMADOL HYDROCHLORIDE 50 MG/1
100 TABLET ORAL EVERY 6 HOURS PRN
Status: DISCONTINUED | OUTPATIENT
Start: 2020-12-10 | End: 2020-12-11 | Stop reason: HOSPADM

## 2020-12-10 RX ORDER — PROMETHAZINE HYDROCHLORIDE 25 MG/1
12.5 TABLET ORAL EVERY 6 HOURS PRN
Status: DISCONTINUED | OUTPATIENT
Start: 2020-12-10 | End: 2020-12-11 | Stop reason: HOSPADM

## 2020-12-10 RX ORDER — 0.9 % SODIUM CHLORIDE 0.9 %
1000 INTRAVENOUS SOLUTION INTRAVENOUS ONCE
Status: COMPLETED | OUTPATIENT
Start: 2020-12-10 | End: 2020-12-10

## 2020-12-10 RX ORDER — POTASSIUM CHLORIDE 20 MEQ/1
60 TABLET, EXTENDED RELEASE ORAL ONCE
Status: COMPLETED | OUTPATIENT
Start: 2020-12-10 | End: 2020-12-10

## 2020-12-10 RX ORDER — ACETAMINOPHEN 650 MG/1
650 SUPPOSITORY RECTAL EVERY 6 HOURS PRN
Status: DISCONTINUED | OUTPATIENT
Start: 2020-12-10 | End: 2020-12-11 | Stop reason: HOSPADM

## 2020-12-10 RX ORDER — POTASSIUM CHLORIDE 7.45 MG/ML
10 INJECTION INTRAVENOUS PRN
Status: DISCONTINUED | OUTPATIENT
Start: 2020-12-10 | End: 2020-12-11 | Stop reason: HOSPADM

## 2020-12-10 RX ORDER — MAGNESIUM SULFATE IN WATER 40 MG/ML
2 INJECTION, SOLUTION INTRAVENOUS PRN
Status: DISCONTINUED | OUTPATIENT
Start: 2020-12-10 | End: 2020-12-11 | Stop reason: HOSPADM

## 2020-12-10 RX ORDER — CYCLOBENZAPRINE HCL 10 MG
10 TABLET ORAL 3 TIMES DAILY PRN
Status: DISCONTINUED | OUTPATIENT
Start: 2020-12-10 | End: 2020-12-11 | Stop reason: HOSPADM

## 2020-12-10 RX ADMIN — ACETAMINOPHEN 650 MG: 325 TABLET ORAL at 21:05

## 2020-12-10 RX ADMIN — SODIUM CHLORIDE: 9 INJECTION, SOLUTION INTRAVENOUS at 20:18

## 2020-12-10 RX ADMIN — ACETAMINOPHEN 650 MG: 325 TABLET ORAL at 12:36

## 2020-12-10 RX ADMIN — OXYCODONE HYDROCHLORIDE AND ACETAMINOPHEN 1000 MG: 500 TABLET ORAL at 12:37

## 2020-12-10 RX ADMIN — SODIUM CHLORIDE 1000 ML: 9 INJECTION, SOLUTION INTRAVENOUS at 09:33

## 2020-12-10 RX ADMIN — KETOROLAC TROMETHAMINE 15 MG: 30 INJECTION, SOLUTION INTRAMUSCULAR; INTRAVENOUS at 10:47

## 2020-12-10 RX ADMIN — POTASSIUM CHLORIDE 40 MEQ: 1500 TABLET, EXTENDED RELEASE ORAL at 12:36

## 2020-12-10 RX ADMIN — Medication 1000 UNITS: at 12:37

## 2020-12-10 RX ADMIN — FLUTICASONE PROPIONATE 1 PUFF: 110 AEROSOL, METERED RESPIRATORY (INHALATION) at 20:39

## 2020-12-10 RX ADMIN — POTASSIUM BICARBONATE 40 MEQ: 782 TABLET, EFFERVESCENT ORAL at 17:07

## 2020-12-10 RX ADMIN — GUAIFENESIN 1200 MG: 600 TABLET, EXTENDED RELEASE ORAL at 12:37

## 2020-12-10 RX ADMIN — Medication 50 MG: at 21:05

## 2020-12-10 RX ADMIN — Medication 50 MG: at 12:37

## 2020-12-10 RX ADMIN — SODIUM CHLORIDE 1566 ML: 9 INJECTION, SOLUTION INTRAVENOUS at 12:25

## 2020-12-10 RX ADMIN — ALBUTEROL SULFATE 2 PUFF: 90 AEROSOL, METERED RESPIRATORY (INHALATION) at 20:38

## 2020-12-10 RX ADMIN — PANTOPRAZOLE SODIUM 40 MG: 40 TABLET, DELAYED RELEASE ORAL at 12:36

## 2020-12-10 RX ADMIN — ENOXAPARIN SODIUM 50 MG: 40 INJECTION SUBCUTANEOUS at 21:21

## 2020-12-10 RX ADMIN — GUAIFENESIN 1200 MG: 600 TABLET, EXTENDED RELEASE ORAL at 21:05

## 2020-12-10 ASSESSMENT — PAIN DESCRIPTION - DESCRIPTORS: DESCRIPTORS: HEADACHE;POUNDING

## 2020-12-10 ASSESSMENT — PAIN DESCRIPTION - ONSET: ONSET: ON-GOING

## 2020-12-10 ASSESSMENT — PAIN SCALES - GENERAL
PAINLEVEL_OUTOF10: 5
PAINLEVEL_OUTOF10: 0
PAINLEVEL_OUTOF10: 5

## 2020-12-10 ASSESSMENT — PAIN DESCRIPTION - LOCATION: LOCATION: BACK;HEAD

## 2020-12-10 ASSESSMENT — PAIN DESCRIPTION - PAIN TYPE: TYPE: ACUTE PAIN

## 2020-12-10 ASSESSMENT — PAIN - FUNCTIONAL ASSESSMENT: PAIN_FUNCTIONAL_ASSESSMENT: ACTIVITIES ARE NOT PREVENTED

## 2020-12-10 ASSESSMENT — PAIN DESCRIPTION - FREQUENCY: FREQUENCY: CONTINUOUS

## 2020-12-10 ASSESSMENT — PAIN DESCRIPTION - ORIENTATION: ORIENTATION: MID;LOWER

## 2020-12-10 ASSESSMENT — PAIN DESCRIPTION - PROGRESSION: CLINICAL_PROGRESSION: NOT CHANGED

## 2020-12-10 NOTE — ED NOTES
Called [address] and informed Anisa Carrizales that the patient is on their way to the unit. Patient transported via wheelchair in a stable condition.      Raj Iniguez  12/10/20 1103

## 2020-12-10 NOTE — H&P
pills or had any genetic history of hypercoagulable disorders. Past Medical History:        Diagnosis Date    Acne teeanger    Asthma     Dx as a child        Past Surgical History:    History reviewed. No pertinent surgical history. Home Medications:   No current facility-administered medications on file prior to encounter. Current Outpatient Medications on File Prior to Encounter   Medication Sig Dispense Refill    budesonide (PULMICORT FLEXHALER) 180 MCG/ACT AEPB inhaler Inhale 2 puffs into the lungs 2 times daily 1 each 1    Spacer/Aero-Holding Chambers VALERIY 1 Device by Does not apply route daily 1 Device 0    fluticasone (FLONASE) 50 MCG/ACT nasal spray 2 sprays by Each Nostril route daily 1 Bottle 0    guaiFENesin (MUCINEX) 600 MG extended release tablet Take 2 tablets by mouth 2 times daily for 10 days 40 tablet 0    clobetasol (TEMOVATE) 0.05 % ointment Apply topically 2 times daily. 45 g 0    acetaminophen (TYLENOL) 500 MG tablet Take 2 tablets by mouth every 6 hours as needed for Pain 20 tablet 0    omeprazole (PRILOSEC) 20 MG delayed release capsule Take 1 capsule by mouth 2 times daily 30 capsule 3    clindamycin (CLINDAGEL) 1 % gel Apply topically daily Apply topically 2 times daily.  Adapalene-Benzoyl Peroxide (EPIDUO FORTE) 0.3-2.5 % GEL Apply topically daily      albuterol sulfate  (90 Base) MCG/ACT inhaler Inhale 2 puffs into the lungs every 6 hours as needed for Wheezing 3 Inhaler 1       Allergies:    Patient has no known allergies. Social History:    reports that she has never smoked. She has never used smokeless tobacco. She reports that she does not drink alcohol or use drugs.     Family History:       Problem Relation Age of Onset    Elevated Lipids Mother 48    High Blood Pressure Mother 48    High Blood Pressure Father 48    Heart Attack Father 47        CAD    Elevated Lipids Father 48    Asthma Father     Asthma Sister         dx as a chlid    Asthma Brother         dx as chlid    Seizures Maternal Grandfather     High Blood Pressure Paternal Grandmother     Breast Cancer Paternal Aunt 52       Diet:  DIET GENERAL;    Review of systems:   Pertinent positives as noted in the HPI. All other systems reviewed and negative. PHYSICAL EXAM:  /69   Pulse 112   Temp 102.1 °F (38.9 °C) (Oral)   Resp 16   Ht 5' 1\" (1.549 m)   Wt 115 lb (52.2 kg)   LMP 11/10/2020   SpO2 96%   BMI 21.73 kg/m²   General appearance: No apparent distress, appears stated age and cooperative. HEENT: Normal cephalic, atraumatic without obvious deformity. Pupils equal, round, and reactive to light. Extra ocular muscles intact. Conjunctivae/corneas clear. Neck: Supple, with full range of motion. No jugular venous distention. Trachea midline. Respiratory:  Normal respiratory effort. Clear to auscultation, bilaterally without Rales/Wheezes/Rhonchi. Cardiovascular: Regular rate and rhythm with normal S1/S2 without murmurs, rubs or gallops. Abdomen: Soft, non-tender, non-distended with normal bowel sounds. Musculoskeletal:  No clubbing, cyanosis or edema bilaterally. Skin: Skin color, texture, turgor normal.  No rashes or lesions. Neurologic:  Neurovascularly intact without any focal sensory/motor deficits. Cranial nerves: II-XII intact, grossly non-focal.  Psychiatric: Alert and oriented, thought content appropriate, normal insight  Capillary Refill: Brisk,< 3 seconds   Peripheral Pulses: +2 palpable, equal bilaterally     Labs:   Recent Labs     12/10/20  0934   WBC 4.9   HGB 14.9   HCT 44.9        Recent Labs     12/10/20  0934      K 3.0*   CL 97*   CO2 25   BUN 10   CREATININE 0.7   CALCIUM 9.6     Recent Labs     12/10/20  0934   AST 24   ALT 16   BILITOT 0.2*   ALKPHOS 51     No results for input(s): INR in the last 72 hours. No results for input(s): Anup Ames in the last 72 hours.     Urinalysis:    Lab Results   Component Value Date NITRU NEGATIVE 03/13/2020    WBCUA 2-4 03/13/2020    BACTERIA NONE SEEN 03/13/2020    RBCUA 10-15 03/13/2020    BLOODU NEGATIVE 03/13/2020    SPECGRAV 1.027 03/13/2020       Radiology:   XR CHEST PORTABLE   Final Result   Infiltrates are noted in the bilateral mid and lower lung fields. **This report has been created using voice recognition software. It may contain minor errors which are inherent in voice recognition technology. **      Final report electronically signed by Dr Clyde Turcios on 12/10/2020 11:31 AM      VL DUP LOWER EXTREMITY VENOUS BILATERAL   Final Result   There is partial compressibility with diminished flow in the proximal aspect of the left popliteal vein. This may be due to chronic DVT. Findings were discussed by Dr. Paz Lopez with Dr. Hernando Buitrago at approximately 11:30 AM 12/10/2020 via telephone. **This report has been created using voice recognition software. It may contain minor errors which are inherent in voice recognition technology. **      Final report electronically signed by Dr Clyde Turcios on 12/10/2020 11:30 AM        Xr Chest Portable    Result Date: 12/10/2020  PROCEDURE: XR CHEST PORTABLE CLINICAL INFORMATION: 77-year-old female with shortness of breath. Covid 19. COMPARISON: No prior study. TECHNIQUE: AP upright view of the chest was obtained. FINDINGS: Some infiltrates are noted in the bilateral mid and lower lung fields. The cardiac silhouette and pulmonary vasculature are within normal limits. There is no significant pleural effusion or pneumothorax. Visualized portions of the upper abdomen are within normal limits. The osseous structures are intact. No acute fractures or suspicious osseous lesions. Infiltrates are noted in the bilateral mid and lower lung fields. **This report has been created using voice recognition software. It may contain minor errors which are inherent in voice recognition technology. ** Final report electronically signed by Dr Ambika Radford Lem Ahumada on 12/10/2020 11:31 AM    Vl Dup Lower Extremity Venous Bilateral    Result Date: 12/10/2020  PROCEDURE: VL DUP LOWER EXTREMITY VENOUS BILATERAL CLINICAL INFORMATION: 70-year-old female with left lower leg pain, +covid. COMPARISON: No prior study. TECHNIQUE: Venous doppler ultrasound was performed of the bilateral lower extremities using gray scale, color flow and spectral doppler imaging. FINDINGS: There is partial compressibility with diminished flow in the proximal aspect of left popliteal vein. There is normal color flow, spectral analysis and compressibility of the common femoral vein and superficial femoral vein bilaterally . There is normal color flow and compressibility in the posterior tibial veins, anterior tibial veins and peroneal veins. There is partial compressibility with diminished flow in the proximal aspect of the left popliteal vein. This may be due to chronic DVT. Findings were discussed by Dr. Lem Ahumada with Dr. Arpit Farmer at approximately 11:30 AM 12/10/2020 via telephone. **This report has been created using voice recognition software. It may contain minor errors which are inherent in voice recognition technology. ** Final report electronically signed by Dr Carola Giron on 12/10/2020 11:30 AM        EKG: sinus tachy    Electronically signed by Jayce Mireles MD on 12/10/2020 at 12:03 PM

## 2020-12-10 NOTE — ED PROVIDER NOTES
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 The duration has been intermittent. Pt denies any shortness of breath, fever or hemoptysis. No exertional chest pain was endorsed either. REVIEW OF SYSTEMS   Cardiac: denies dyspnea on exertion; neg Chest Pain, Denies syncope   Respiratory: +sob and dyspnea; Denies cough or hemoptysis   GI: Denies Vomiting or Diarrhea   MSK: +bilateral leg pain  General: +chills, myalgia, denies Fever   All other review of systems are reviewed and are otherwise negative. PAST MEDICAL & SURGICAL HISTORY   Past Medical History:   Diagnosis Date    Acne jeffanger    Asthma     Dx as a child       History reviewed. No pertinent surgical history.      CURRENT MEDICATIONS        ALLERGIES   No Known Allergies     SOCIAL & FAMILY HISTORY   Social History     Socioeconomic History    Marital status:      Spouse name: Kboe Deluca Number of children: 1    Years of education: 16    Highest education level: None   Occupational History    Occupation: physician   Social Needs    Financial resource strain: Not hard at all   [employer] insecurity     Worry: Never true     Inability: Never true    Transportation needs     Medical: No     Non-medical: No   Tobacco Use    Smoking status: Never Smoker    Smokeless tobacco: Never Used   Substance and Sexual Activity    Alcohol use: No    Drug use: No    Sexual activity: Yes     Partners: Male     Comment: - 12 years    Lifestyle    Physical activity     Days per week: None     Minutes per session: None    Stress: None   Relationships    Social connections     Talks on phone: None     Gets together: None     Attends [employer] service: None     Active member of club or organization: None     Attends meetings of clubs or organizations: None     Relationship status: None    Intimate partner violence     Fear of current or ex partner: None     Emotionally abused: None     Physically abused: None     Forced sexual activity: None   Other Topics Concern    None   Social History Narrative    Patient is an internal medicine hospitalist here at [facility]      Family History   Problem Relation Age of Onset    Elevated Lipids Mother 48    High Blood Pressure Mother 48    High Blood Pressure Father 48    Heart Attack Father 47        CAD    Elevated Lipids Father 48    Asthma Father     Asthma Sister         dx as a chlid    Asthma Brother         dx as chlid    Seizures Maternal Grandfather     High Blood Pressure Paternal Grandmother     Breast Cancer Paternal Aunt 52        PHYSICAL EXAM   VITAL SIGNS: /69   Pulse 112   Temp 98.5 °F (36.9 °C)   Resp 16   Ht 5' 1\" (1.549 m)   Wt 115 lb (52.2 kg)   LMP 11/10/2020   SpO2 96%   BMI 21.73 kg/m²    Constitutional: Well developed, well nourished, no acute distress   HENT: Atraumatic, moist mucus membranes   Neck: supple, no JVD   Respiratory: Lungs Clear, no retractions   Cardiovascular: Normal rhythm, tachycardic rate, no murmurs   Vascular: Radial pulses 2+ equal bilaterally   GI: Soft, nontender, normal bowel sounds   Musculoskeletal: No edema, no deformities   Integument: Skin warm and dry, no petechiae   Neurologic: Alert & oriented, normal speech   Psych: Pleasant affect, no hallucinations     EKG (interpreted by me) 12/10/20 09:30  Rhythm: Sinus   Rate: 112 BPM   Axis: normal   Conduction: normal   ST/T Segments: nonacute with possible isolated lead III TWI but no obvious and definitive territorial ischemic changes noted    RADIOLOGY/PROCEDURES   XR CHEST PORTABLE   Final Result   Infiltrates are noted in the bilateral mid and lower lung fields. **This report has been created using voice recognition software. It may contain minor errors which are inherent in voice recognition technology. **      Final report electronically signed by Dr Timmy Bains on 12/10/2020 11:31 AM      VL DUP LOWER EXTREMITY VENOUS BILATERAL   Final Result   There is partial compressibility with diminished flow in the proximal aspect of the left popliteal vein. This may be due to chronic DVT. Findings were discussed by Dr. Stacy Antonio with Dr. Adan Castellon at approximately 11:30 AM 12/10/2020 via telephone. **This report has been created using voice recognition software. It may contain minor errors which are inherent in voice recognition technology. **      Final report electronically signed by Dr Timmy Bains on 12/10/2020 11:30 AM           LABS   Labs Reviewed   CBC WITH AUTO DIFFERENTIAL - Abnormal; Notable for the following components:       Result Value    MPV 9.3 (*)     Lymphocytes Absolute 0.7 (*)     All other components within normal limits   COMPREHENSIVE METABOLIC PANEL W/ REFLEX TO MG FOR LOW K - Abnormal; Notable for the following components:    Potassium reflex Magnesium 3.0 (*)     Chloride 97 (*)     Total Protein 8.7 (*)     Total Bilirubin 0.2 (*)     All other components within normal limits   OSMOLALITY - Abnormal; Notable for the following components:    Osmolality Calc 274.6 (*)     All other components within normal limits   CULTURE, BLOOD 2   CULTURE, BLOOD 1   HCG, SERUM, QUALITATIVE   TROPONIN   D-DIMER, QUANTITATIVE   ANION GAP   GLOMERULAR FILTRATION RATE, ESTIMATED   MAGNESIUM   LACTIC ACID, PLASMA   POTASSIUM   PREPARE COVID-19 CONVALESCENT PLASMA   ABO/RH        ED COURSE & MEDICAL DECISION MAKING   Pertinent Labs & Imaging studies reviewed and interpreted. (See chart for details)   See chart for details of medications given during the ED stay.      Vitals:    12/10/20 1442   BP:    Pulse:    Resp: Temp: 98.5 °F (36.9 °C)   SpO2:         Consultation: pending workup    The transition of care will be at 3:13 PM.     Differential Diagnosis: PE, COVID infection, Acute Coronary Syndrome, Congestive Heart Failure, Myocardial Infarction, Pulmonary Embolus, Thoracic Dissection, Pneumonia, Pneumothorax, other. FINAL IMPRESSION   1. Tachycardia    2. COVID-19         Plan:   MDM - Patient is stable and has no obvious respiratory distress. Pt has tachycardia, and endorsed recent bilateral leg/calf pain. Will stratify with d-dimer. I did not hear any abnormal respiratory sounds on ascultation. Will give fluids. Pt declined any treatment for headache at this time. Pt's d-dimer was negative. Pt will be admitted to Dr. Issac Redd service for monitoring and convalescent plasma and other treatments. 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  I relayed that info to the admitting team.      Electronically signed by: Kassidy Quintana MD, 12/10/2020 3:13 PM   (This note was completed with a voice recognition program)         Moreno Clements MD  12/10/20 1981

## 2020-12-11 VITALS
HEIGHT: 61 IN | TEMPERATURE: 99.1 F | HEART RATE: 99 BPM | DIASTOLIC BLOOD PRESSURE: 70 MMHG | BODY MASS INDEX: 21.71 KG/M2 | OXYGEN SATURATION: 94 % | WEIGHT: 115 LBS | RESPIRATION RATE: 18 BRPM | SYSTOLIC BLOOD PRESSURE: 100 MMHG

## 2020-12-11 LAB
ANION GAP SERPL CALCULATED.3IONS-SCNC: 12 MEQ/L (ref 8–16)
BASOPHILS # BLD: 0.2 %
BASOPHILS ABSOLUTE: 0 THOU/MM3 (ref 0–0.1)
BUN BLDV-MCNC: 7 MG/DL (ref 7–22)
CALCIUM SERPL-MCNC: 8.1 MG/DL (ref 8.5–10.5)
CHLORIDE BLD-SCNC: 99 MEQ/L (ref 98–111)
CO2: 22 MEQ/L (ref 23–33)
CREAT SERPL-MCNC: 0.6 MG/DL (ref 0.4–1.2)
EOSINOPHIL # BLD: 0 %
EOSINOPHILS ABSOLUTE: 0 THOU/MM3 (ref 0–0.4)
ERYTHROCYTE [DISTWIDTH] IN BLOOD BY AUTOMATED COUNT: 13.2 % (ref 11.5–14.5)
ERYTHROCYTE [DISTWIDTH] IN BLOOD BY AUTOMATED COUNT: 42.6 FL (ref 35–45)
GFR SERPL CREATININE-BSD FRML MDRD: > 90 ML/MIN/1.73M2
GLUCOSE BLD-MCNC: 101 MG/DL (ref 70–108)
HCT VFR BLD CALC: 39.4 % (ref 37–47)
HEMOGLOBIN: 13 GM/DL (ref 12–16)
IMMATURE GRANS (ABS): 0.04 THOU/MM3 (ref 0–0.07)
IMMATURE GRANULOCYTES: 0.7 %
LYMPHOCYTES # BLD: 14.4 %
LYMPHOCYTES ABSOLUTE: 0.9 THOU/MM3 (ref 1–4.8)
MAGNESIUM: 1.8 MG/DL (ref 1.6–2.4)
MCH RBC QN AUTO: 29.1 PG (ref 26–33)
MCHC RBC AUTO-ENTMCNC: 33 GM/DL (ref 32.2–35.5)
MCV RBC AUTO: 88.1 FL (ref 81–99)
MONOCYTES # BLD: 4.5 %
MONOCYTES ABSOLUTE: 0.3 THOU/MM3 (ref 0.4–1.3)
NUCLEATED RED BLOOD CELLS: 0 /100 WBC
PLATELET # BLD: 174 THOU/MM3 (ref 130–400)
PMV BLD AUTO: 9.4 FL (ref 9.4–12.4)
POTASSIUM REFLEX MAGNESIUM: 3.5 MEQ/L (ref 3.5–5.2)
RBC # BLD: 4.47 MILL/MM3 (ref 4.2–5.4)
SEG NEUTROPHILS: 80.2 %
SEGMENTED NEUTROPHILS ABSOLUTE COUNT: 4.8 THOU/MM3 (ref 1.8–7.7)
SODIUM BLD-SCNC: 133 MEQ/L (ref 135–145)
WBC # BLD: 6 THOU/MM3 (ref 4.8–10.8)

## 2020-12-11 PROCEDURE — 36415 COLL VENOUS BLD VENIPUNCTURE: CPT

## 2020-12-11 PROCEDURE — 94640 AIRWAY INHALATION TREATMENT: CPT

## 2020-12-11 PROCEDURE — 6360000002 HC RX W HCPCS: Performed by: INTERNAL MEDICINE

## 2020-12-11 PROCEDURE — 6370000000 HC RX 637 (ALT 250 FOR IP): Performed by: INTERNAL MEDICINE

## 2020-12-11 PROCEDURE — 2580000003 HC RX 258: Performed by: INTERNAL MEDICINE

## 2020-12-11 PROCEDURE — 6360000002 HC RX W HCPCS: Performed by: PHYSICIAN ASSISTANT

## 2020-12-11 PROCEDURE — 99217 PR OBSERVATION CARE DISCHARGE MANAGEMENT: CPT | Performed by: INTERNAL MEDICINE

## 2020-12-11 PROCEDURE — 85025 COMPLETE CBC W/AUTO DIFF WBC: CPT

## 2020-12-11 PROCEDURE — 94760 N-INVAS EAR/PLS OXIMETRY 1: CPT

## 2020-12-11 PROCEDURE — 80048 BASIC METABOLIC PNL TOTAL CA: CPT

## 2020-12-11 PROCEDURE — 83735 ASSAY OF MAGNESIUM: CPT

## 2020-12-11 RX ORDER — KETOROLAC TROMETHAMINE 30 MG/ML
30 INJECTION, SOLUTION INTRAMUSCULAR; INTRAVENOUS ONCE
Status: DISCONTINUED | OUTPATIENT
Start: 2020-12-11 | End: 2020-12-11 | Stop reason: HOSPADM

## 2020-12-11 RX ORDER — 0.9 % SODIUM CHLORIDE 0.9 %
500 INTRAVENOUS SOLUTION INTRAVENOUS ONCE
Status: COMPLETED | OUTPATIENT
Start: 2020-12-11 | End: 2020-12-11

## 2020-12-11 RX ORDER — LOPERAMIDE HYDROCHLORIDE 2 MG/1
2 CAPSULE ORAL 4 TIMES DAILY PRN
Status: DISCONTINUED | OUTPATIENT
Start: 2020-12-11 | End: 2020-12-11 | Stop reason: HOSPADM

## 2020-12-11 RX ORDER — AMOXICILLIN AND CLAVULANATE POTASSIUM 500; 125 MG/1; MG/1
1 TABLET, FILM COATED ORAL 3 TIMES DAILY
Qty: 15 TABLET | Refills: 0 | Status: SHIPPED | OUTPATIENT
Start: 2020-12-11 | End: 2020-12-16

## 2020-12-11 RX ORDER — CHOLECALCIFEROL (VITAMIN D3) 25 MCG
1000 TABLET ORAL DAILY
Qty: 60 TABLET | Refills: 0 | Status: SHIPPED | OUTPATIENT
Start: 2020-12-12

## 2020-12-11 RX ORDER — METOCLOPRAMIDE HYDROCHLORIDE 5 MG/ML
10 INJECTION INTRAMUSCULAR; INTRAVENOUS ONCE
Status: COMPLETED | OUTPATIENT
Start: 2020-12-11 | End: 2020-12-11

## 2020-12-11 RX ORDER — AZITHROMYCIN 500 MG/1
500 TABLET, FILM COATED ORAL DAILY
Qty: 3 TABLET | Refills: 0 | Status: SHIPPED | OUTPATIENT
Start: 2020-12-11 | End: 2020-12-14

## 2020-12-11 RX ORDER — ZINC SULFATE 50(220)MG
50 CAPSULE ORAL 2 TIMES DAILY
Qty: 30 CAPSULE | Refills: 3 | COMMUNITY
Start: 2020-12-11

## 2020-12-11 RX ORDER — POTASSIUM CHLORIDE 20 MEQ/1
20 TABLET, EXTENDED RELEASE ORAL 2 TIMES DAILY
Qty: 6 TABLET | Refills: 0 | Status: SHIPPED | OUTPATIENT
Start: 2020-12-11 | End: 2020-12-14

## 2020-12-11 RX ORDER — LACTOBACILLUS RHAMNOSUS GG 10B CELL
1 CAPSULE ORAL
Qty: 30 CAPSULE | Refills: 0 | Status: SHIPPED | OUTPATIENT
Start: 2020-12-12

## 2020-12-11 RX ORDER — DIPHENHYDRAMINE HYDROCHLORIDE 50 MG/ML
50 INJECTION INTRAMUSCULAR; INTRAVENOUS ONCE
Status: COMPLETED | OUTPATIENT
Start: 2020-12-11 | End: 2020-12-11

## 2020-12-11 RX ADMIN — Medication 50 MG: at 08:14

## 2020-12-11 RX ADMIN — CEFTRIAXONE SODIUM 1 G: 1 INJECTION, POWDER, FOR SOLUTION INTRAMUSCULAR; INTRAVENOUS at 14:49

## 2020-12-11 RX ADMIN — Medication 1 CAPSULE: at 08:13

## 2020-12-11 RX ADMIN — ALBUTEROL SULFATE 2 PUFF: 90 AEROSOL, METERED RESPIRATORY (INHALATION) at 08:31

## 2020-12-11 RX ADMIN — ENOXAPARIN SODIUM 50 MG: 40 INJECTION SUBCUTANEOUS at 08:14

## 2020-12-11 RX ADMIN — SODIUM CHLORIDE 500 ML: 9 INJECTION, SOLUTION INTRAVENOUS at 16:24

## 2020-12-11 RX ADMIN — METOCLOPRAMIDE 10 MG: 5 INJECTION, SOLUTION INTRAMUSCULAR; INTRAVENOUS at 01:36

## 2020-12-11 RX ADMIN — ONDANSETRON 4 MG: 2 INJECTION INTRAMUSCULAR; INTRAVENOUS at 01:18

## 2020-12-11 RX ADMIN — GUAIFENESIN 1200 MG: 600 TABLET, EXTENDED RELEASE ORAL at 08:13

## 2020-12-11 RX ADMIN — SODIUM CHLORIDE: 9 INJECTION, SOLUTION INTRAVENOUS at 05:47

## 2020-12-11 RX ADMIN — PANTOPRAZOLE SODIUM 40 MG: 40 TABLET, DELAYED RELEASE ORAL at 05:47

## 2020-12-11 RX ADMIN — OXYCODONE HYDROCHLORIDE AND ACETAMINOPHEN 1000 MG: 500 TABLET ORAL at 08:14

## 2020-12-11 RX ADMIN — ACETAMINOPHEN 650 MG: 325 TABLET ORAL at 05:47

## 2020-12-11 RX ADMIN — ACETAMINOPHEN 650 MG: 325 TABLET ORAL at 18:18

## 2020-12-11 RX ADMIN — ONDANSETRON 4 MG: 2 INJECTION INTRAMUSCULAR; INTRAVENOUS at 17:17

## 2020-12-11 RX ADMIN — FLUTICASONE PROPIONATE 1 PUFF: 110 AEROSOL, METERED RESPIRATORY (INHALATION) at 08:30

## 2020-12-11 RX ADMIN — ALBUTEROL SULFATE 2 PUFF: 90 AEROSOL, METERED RESPIRATORY (INHALATION) at 13:51

## 2020-12-11 RX ADMIN — Medication 1000 UNITS: at 08:14

## 2020-12-11 RX ADMIN — AZITHROMYCIN DIHYDRATE 500 MG: 500 INJECTION, POWDER, LYOPHILIZED, FOR SOLUTION INTRAVENOUS at 15:21

## 2020-12-11 RX ADMIN — DIPHENHYDRAMINE HYDROCHLORIDE 50 MG: 50 INJECTION INTRAMUSCULAR; INTRAVENOUS at 01:36

## 2020-12-11 RX ADMIN — ACETAMINOPHEN 650 MG: 325 TABLET ORAL at 11:53

## 2020-12-11 ASSESSMENT — PAIN SCALES - GENERAL
PAINLEVEL_OUTOF10: 3
PAINLEVEL_OUTOF10: 0
PAINLEVEL_OUTOF10: 0
PAINLEVEL_OUTOF10: 5
PAINLEVEL_OUTOF10: 3

## 2020-12-11 ASSESSMENT — PAIN DESCRIPTION - FREQUENCY
FREQUENCY: CONTINUOUS
FREQUENCY: CONTINUOUS

## 2020-12-11 ASSESSMENT — PAIN DESCRIPTION - LOCATION
LOCATION: HEAD
LOCATION: HEAD

## 2020-12-11 ASSESSMENT — PAIN DESCRIPTION - PROGRESSION
CLINICAL_PROGRESSION: NOT CHANGED
CLINICAL_PROGRESSION: NOT CHANGED
CLINICAL_PROGRESSION: GRADUALLY IMPROVING
CLINICAL_PROGRESSION: NOT CHANGED
CLINICAL_PROGRESSION: NOT CHANGED
CLINICAL_PROGRESSION: GRADUALLY IMPROVING
CLINICAL_PROGRESSION: GRADUALLY IMPROVING
CLINICAL_PROGRESSION: NOT CHANGED
CLINICAL_PROGRESSION: GRADUALLY IMPROVING
CLINICAL_PROGRESSION: GRADUALLY IMPROVING
CLINICAL_PROGRESSION: NOT CHANGED

## 2020-12-11 ASSESSMENT — PAIN - FUNCTIONAL ASSESSMENT: PAIN_FUNCTIONAL_ASSESSMENT: ACTIVITIES ARE NOT PREVENTED

## 2020-12-11 ASSESSMENT — PAIN DESCRIPTION - PAIN TYPE
TYPE: ACUTE PAIN
TYPE: ACUTE PAIN

## 2020-12-11 ASSESSMENT — PAIN DESCRIPTION - ORIENTATION
ORIENTATION: ANTERIOR
ORIENTATION: ANTERIOR

## 2020-12-11 ASSESSMENT — PAIN DESCRIPTION - DESCRIPTORS
DESCRIPTORS: HEADACHE
DESCRIPTORS: HEADACHE

## 2020-12-11 NOTE — PROGRESS NOTES
Discharge paperwork was discussed with the patient and all questioned fully answered. She will call me if any problems arise.

## 2020-12-11 NOTE — DISCHARGE SUMMARY
Hospitalist -progress      Patient: Luis Sanchez    Unit/Bed:6A-19/019-A  YOB: 1980  MRN: [id number]   Acct: [de-identified]   PCP: Karen Navas MD    Date of Service: Pt seen/examined on 12/11/20  and Admitted to Outpatient with expected LOS less than two midnights due to medical therapy. Chief Complaint: Shortness of breath, fever, muscle aches    Assessment and Plan:-  1. COVID-19 pneumonia-not hypoxic does not meet criteria for antiviral or Decadron, will add vitamin C, vitamin D, zinc, probiotic, will add 1 unit of convalescent plasma-patient has consented. IV fluid bolus 2.5 L followed by 125 mL/h of normal saline. Patient was prescribed inhalers as outpatient by the PCP-we will continue it here    12/11-low-grade fever still with mild headache-patient refused plasma, not took any tramadol or Flexeril for headache and body ache, patient does not still qualify for Decadron or antiviral IV remains 99% on room air. Currently on vitamins, probiotic and zinc, pro-Juan J mildly elevated patient requested for antibiotics. Will give oral antibiotics at discharge. 2.  Acute versus chronic popliteal vein DVT on the left-blood COVID-19 diagnosis being of prothrombotic state will treat this with full dose Lovenox and change it to Eliquis treatment dose tomorrow . Discussed with the patient agreed with the plan . 12/11 started on starter pack of Eliquis at discharge. 3.history of GERD-on omeprazole-continue    4. History of mild intermittent asthma-uses Proventil as needed. Disposition plan-plan for discharge today  Stable for discharge  Quarantine for 1 more week. PCP in 1 week    Discharge time 20 minutes      History Of Present Illness: This is 40-year-old lady, who is a physician at [employer]), diagnosed recently with COVID-19 on 12/07/2020 after complaining of subjective shortness of breath, fever, chills, nausea, generalized aches and loss of taste and smell.   And occasional heaviness/tenderness in the left leg. Patient was quarantining himself herself at home however was feeling worse than usual with subjective shortness of breath especially on exertion that she presented to the ER. Was found to be having high-grade fever 102, tachycardic heart rate in the 120s, normal blood pressure, no tachypnea saturating 99% on room air. Chest x-ray shows bilateral pneumonia, and left lower extremity venous duplex showed questionable DVT in the popliteal vein-patient has not recently traveled/nor is on any birth control pills or had any genetic history of hypercoagulable disorders. Medication List      START taking these medications    amoxicillin-clavulanate 500-125 MG per tablet  Commonly known as:   Augmentin  Take 1 tablet by mouth 3 times daily for 5 days     apixaban starter pack 5 MG Tbpk tablet  Commonly known as:  Eliquis DVT/PE Starter Pack  Take 1 tablet by mouth See Admin Instructions     ascorbic acid 1000 MG tablet  Commonly known as:  VITAMIN C  Take 1 tablet by mouth daily  Start taking on:  December 12, 2020     azithromycin 500 MG tablet  Commonly known as:  ZITHROMAX  Take 1 tablet by mouth daily for 3 days     lactobacillus capsule  Take 1 capsule by mouth daily (with breakfast)  Start taking on:  December 12, 2020     potassium chloride 20 MEQ extended release tablet  Commonly known as:  KLOR-CON M  Take 1 tablet by mouth 2 times daily for 3 days     Vitamin D3 25 MCG Tabs  Take 1 tablet by mouth daily  Start taking on:  December 12, 2020     zinc sulfate 220 (50 Zn) MG capsule  Commonly known as:  ZINCATE  Take 1 capsule by mouth 2 times daily        CONTINUE taking these medications    acetaminophen 500 MG tablet  Commonly known as:  TYLENOL  Take 2 tablets by mouth every 6 hours as needed for Pain     albuterol sulfate  (90 Base) MCG/ACT inhaler  Inhale 2 puffs into the lungs every 6 hours as needed for Wheezing     budesonide 180 MCG/ACT Aepb inhaler  Commonly known as:  Pulmicort Flexhaler  Inhale 2 puffs into the lungs 2 times daily     clindamycin 1 % gel  Commonly known as:  CLINDAGEL     clobetasol 0.05 % ointment  Commonly known as:  Temovate  Apply topically 2 times daily. Epiduo Forte 0.3-2.5 % Gel  Generic drug:  Adapalene-Benzoyl Peroxide     fluticasone 50 MCG/ACT nasal spray  Commonly known as:  FLONASE  2 sprays by Each Nostril route daily     guaiFENesin 600 MG extended release tablet  Commonly known as:  MUCINEX  Take 2 tablets by mouth 2 times daily for 10 days     omeprazole 20 MG delayed release capsule  Commonly known as:  PriLOSEC  Take 1 capsule by mouth 2 times daily     Spacer/Aero-Holding [employer]  1 Device by Does not apply route daily           Where to Get Your Medications      These medications were sent to [facility] , [address] [address]    Phone:  [phone]   · amoxicillin-clavulanate 500-125 MG per tablet  · apixaban starter pack 5 MG Tbpk tablet  · ascorbic acid 1000 MG tablet  · azithromycin 500 MG tablet  · lactobacillus capsule  · potassium chloride 20 MEQ extended release tablet  · Vitamin D3 25 MCG Tabs     You can get these medications from any pharmacy    You don't need a prescription for these medications  · zinc sulfate 220 (50 Zn) MG capsule         Diet:  DIET GENERAL;    Review of systems:   Pertinent positives as noted in the HPI. All other systems reviewed and negative. PHYSICAL EXAM:  /70   Pulse 99   Temp 100 °F (37.8 °C) (Oral)   Resp 18   Ht 5' 1\" (1.549 m)   Wt 115 lb (52.2 kg)   [facility] 11/10/2020   SpO2 94%   BMI 21.73 kg/m²   General appearance: No apparent distress, appears stated age and cooperative. HEENT: Normal cephalic, atraumatic without obvious deformity. Pupils equal, round, and reactive to light. Extra ocular muscles intact.  Conjunctivae/corneas clear.  Neck: Supple, with full range of motion. No jugular venous distention. Trachea midline. Respiratory:  Normal respiratory effort. Clear to auscultation, bilaterally without Rales/Wheezes/Rhonchi. Cardiovascular: Regular rate and rhythm with normal S1/S2 without murmurs, rubs or gallops. Abdomen: Soft, non-tender, non-distended with normal bowel sounds. Musculoskeletal:  No clubbing, cyanosis or edema bilaterally. Skin: Skin color, texture, turgor normal.  No rashes or lesions. Neurologic:  Neurovascularly intact without any focal sensory/motor deficits. Cranial nerves: II-XII intact, grossly non-focal.  Psychiatric: Alert and oriented, thought content appropriate, normal insight  Capillary Refill: Brisk,< 3 seconds   Peripheral Pulses: +2 palpable, equal bilaterally     Labs:   Recent Labs     12/10/20  0934 12/11/20  0607   WBC 4.9 6.0   HGB 14.9 13.0   HCT 44.9 39.4    174     Recent Labs     12/10/20  0934 12/10/20  1520 12/10/20  2218 12/11/20  0607     --   --  133*   K 3.0* 3.2* 3.5 3.5   CL 97*  --   --  99   CO2 25  --   --  22*   BUN 10  --   --  7   CREATININE 0.7  --   --  0.6   CALCIUM 9.6  --   --  8.1*     Recent Labs     12/10/20  0934   AST 24   ALT 16   BILITOT 0.2*   ALKPHOS 51     No results for input(s): INR in the last 72 hours. No results for input(s): Monty Marbin in the last 72 hours. Urinalysis:    Lab Results   Component Value Date    NITRU NEGATIVE 03/13/2020    WBCUA 2-4 03/13/2020    BACTERIA NONE SEEN 03/13/2020    RBCUA 10-15 03/13/2020    BLOODU NEGATIVE 03/13/2020    SPECGRAV 1.027 03/13/2020       Radiology:   XR CHEST PORTABLE   Final Result   Infiltrates are noted in the bilateral mid and lower lung fields. **This report has been created using voice recognition software. It may contain minor errors which are inherent in voice recognition technology. **      Final report electronically signed by Dr Timmy Bains on 12/10/2020 11:31 AM VL DUP LOWER EXTREMITY VENOUS BILATERAL   Final Result   There is partial compressibility with diminished flow in the proximal aspect of the left popliteal vein. This may be due to chronic DVT. Findings were discussed by Dr. Paz Lopez with Dr. Hernando Buitrago at approximately 11:30 AM 12/10/2020 via telephone. **This report has been created using voice recognition software. It may contain minor errors which are inherent in voice recognition technology. **      Final report electronically signed by Dr Clyde Turcios on 12/10/2020 11:30 AM        Xr Chest Portable    Result Date: 12/10/2020  PROCEDURE: XR CHEST PORTABLE CLINICAL INFORMATION: 27-year-old female with shortness of breath. Covid 19. COMPARISON: No prior study. TECHNIQUE: AP upright view of the chest was obtained. FINDINGS: Some infiltrates are noted in the bilateral mid and lower lung fields. The cardiac silhouette and pulmonary vasculature are within normal limits. There is no significant pleural effusion or pneumothorax. Visualized portions of the upper abdomen are within normal limits. The osseous structures are intact. No acute fractures or suspicious osseous lesions. Infiltrates are noted in the bilateral mid and lower lung fields. **This report has been created using voice recognition software. It may contain minor errors which are inherent in voice recognition technology. ** Final report electronically signed by Dr Clyde Turcios on 12/10/2020 11:31 AM    Vl Dup Lower Extremity Venous Bilateral    Result Date: 12/10/2020  PROCEDURE: VL DUP LOWER EXTREMITY VENOUS BILATERAL CLINICAL INFORMATION: 27-year-old female with left lower leg pain, +covid. COMPARISON: No prior study. TECHNIQUE: Venous doppler ultrasound was performed of the bilateral lower extremities using gray scale, color flow and spectral doppler imaging. FINDINGS: There is partial compressibility with diminished flow in the proximal aspect of left popliteal vein.  There is normal color flow, spectral analysis and compressibility of the common femoral vein and superficial femoral vein bilaterally . There is normal color flow and compressibility in the posterior tibial veins, anterior tibial veins and peroneal veins. There is partial compressibility with diminished flow in the proximal aspect of the left popliteal vein. This may be due to chronic DVT. Findings were discussed by Dr. Brii Hoffman with Dr. Edwige Bennett at approximately 11:30 AM 12/10/2020 via telephone. **This report has been created using voice recognition software. It may contain minor errors which are inherent in voice recognition technology. ** Final report electronically signed by Dr Baldo Lockett on 12/10/2020 11:30 AM        EKG: sinus tachy    Electronically signed by Lamar David MD on 12/11/2020 at 3:37 PM

## 2020-12-11 NOTE — FLOWSHEET NOTE
12/10/20 2040   Provider Notification   Reason for Communication Review case   Provider Name [facility]   Provider Notification Advance Practice Clinician (CNS, NP, CNM, CRNA, PA)   Method of Communication Secure Message   Response No new orders   Notification Time 2115     Patient was admitted for worsening fever. Currently her fever is 99.9 oral. Patient had said that she spoke with Dr. Sherrell Vasquez and she was going to start her on Remdesivir because she is febrile and has bilateral pneumonia. I do not have an order for Remdesivir. Patient would like to start it if possible. Is that something we wanted to do? Thank You    2230: Hospitalist came and talked to the patient and explained we are not going to start Remdesivir as she does not meet hospital protocol for it. Hospitalist-[place] also placed lab orders at this time.

## 2020-12-11 NOTE — PROGRESS NOTES
This RN asked Dr. Cyndee eLe about having any prescriptions for her upon discharge. Pt's prescriptions were brought up via meds to beds.

## 2020-12-11 NOTE — CARE COORDINATION
DISASTER CHARTING    12/11/20, 7:53 AM EST    DISCHARGE ONGOING EVALUATION:     Padmini Ba day: 0  Location: 6A-19/019-A Reason for admit: COVID-19 [U07.1]   Barriers to Discharge: Admitted for worsening fever. +Covid. IVF at 125/hr. Lovenox, Vits. Presently last temp 100.6. Room air with sat 94%. Diarrhea. Immodium. Headache. Migraine cocktail. PCP: Bhavin Ballesteros MD  Patient Goals/Plan/Treatment Preferences: Met with pt today. From home with spouse and child. No discharge needs requested. Likely home today following fluid bolus and antibiotic.     12/11/20, 3:40 PM EST    Patient goals/plan/ treatment preferences discussed by  and . Patient goals/plan/ treatment preferences reviewed with patient/ family. Patient/ family verbalize understanding of discharge plan and are in agreement with goal/plan/treatment preferences. Understanding was demonstrated using the teach back method. AVS provided by RN at time of discharge, which includes all necessary medical information pertaining to the patients current course of illness, treatment, post-discharge goals of care, and treatment preferences. Discharge planned for today. Pt denies home going needs.

## 2020-12-12 ENCOUNTER — CARE COORDINATION (OUTPATIENT)
Dept: OTHER | Facility: CLINIC | Age: 40
End: 2020-12-12

## 2020-12-12 NOTE — CARE COORDINATION
[facility] Transitions Initial Follow Up Call    Call within 2 business days of discharge: Yes    Patient: Prisca Aquino Patient : 1980   MRN: [id number]  Reason for Admission: Cough/ ever/ Tachycardia/ COVID +  Discharge Date: 20 RARS: No data recorded    Last Discharge [facility]       Complaint Diagnosis Description Type Department Provider    12/10/20 Fever; Cough; Positive For Covid-19 Tachycardia . .. ED to Hosp-Admission (Discharged) (ADMITTED) [address] .. Patient contacted regarding [id number] diagnosis\". Discussed COVID-19 related testing which was available at this time. Test results were positive. Patient informed of results, if available? Yes    Care Transition Nurse/ Ambulatory Care Manager contacted the patient by telephone to perform post discharge assessment. Call within 2 business days of discharge: Yes. Verified name and  with patient as identifiers. Provided introduction to self, and explanation of the CTN/ACM role, and reason for call due to risk factors for infection and/or exposure to COVID-19. Symptoms reviewed with patient who verbalized the following symptoms: fever, fatigue, cough and fast heart rate. 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 She is feeling a little better today. Her fever seems to be coming down and staying in the 100-99^ range. She is able to check her pulse ox at home and has occasionally dropped to 91% but cis able to bring it back up to 99% on room air with her breathing exercises. She admits to some anxiety with the diagnosis as she has asthma and is aware of all the complications. She does have several colleagues and resources available to her. She is to contact [facility] on 12/15/20 to discuss RTW date. Due to no new or worsening symptoms encounter was not routed to provider for escalation. Discussed follow-up appointments.  If no appointment was previously scheduled, prescriptions and are they filled?: Yes  Have you been contacted by a [address]?: Yes  Have you scheduled your follow up appointment?: No  Were you discharged with any Home Care or Post Acute Services: No  Do you feel like you have everything you need to keep you well at home?: Yes  Care Transitions Interventions  No Identified Needs     Other Services: Declined (Comment: Declined LOOP)          Follow Up  No future appointments.     Barbara Johnson RN

## 2020-12-15 ENCOUNTER — VIRTUAL VISIT (OUTPATIENT)
Dept: FAMILY MEDICINE CLINIC | Age: 40
End: 2020-12-15

## 2020-12-15 PROCEDURE — 99214 OFFICE O/P EST MOD 30 MIN: CPT | Performed by: FAMILY MEDICINE

## 2020-12-15 NOTE — PROGRESS NOTES
12/15/2020    TELEHEALTH EVALUATION -- Audio/Visual (During IWN-17 public health emergency)    HPI:    Bruce Officer (:  1980) has requested an audio/video evaluation for the following concern(s):    Positive test 2020 (Done ). Symptoms started  went to emergency room due to shortness of breath, fever, tachycardia. Found to have COVID-19 pneumonia, nonhypoxic. Did not meet criteria for antiviral or Decadron but vitamin C, D, zinc, probiotic was given. She also was given 1 unit of convalescent plasma. She received IV fluid bolus 2.5 L followed by 125 cc of normal saline per hour. Venous ultrasound lower extremity showed acute versus chronic popliteal vein DVT on the left. She was a started on full dose Lovenox and then changed to Eliquis upon discharge. Today she is doing better, is still mild tachycardic, taking all the medications as prescribed. Denies any side effects from the medications. She denies any history of thromboembolic disorder. No history of blood disorders in the family either. She still has been running low-grade fever. This morning her temperature was 100.0    Review of Systems[de-identified]  Positive for low-grade fever. Negative for chills. Negative for congestion, shortness of breath much improved. Denies any chest pain. Denies abdominal pain, diarrhea, nausea, vomiting. Prior to Visit Medications    Medication Sig Taking?  Authorizing Provider   lactobacillus (CULTURELLE) capsule Take 1 capsule by mouth daily (with breakfast)  Frankie Cornell MD   vitamin C (VITAMIN C) 1000 MG tablet Take 1 tablet by mouth daily  [address], MD   Cholecalciferol (VITAMIN D) 25 MCG TABS Take 1 tablet by mouth daily  [address], MD zinc sulfate (ZINCATE) 220 (50 Zn) MG capsule Take 1 capsule by mouth 2 times daily  [address], MD   potassium chloride (KLOR-CON M) 20 MEQ extended release tablet Take 1 tablet by mouth 2 times daily for 3 days  [address], MD   apixaban starter pack (ELIQUIS DVT/PE STARTER PACK) 5 MG TBPK tablet Take 1 tablet by mouth See [address] [place] [address] MD Laura   budesonide (PULMICORT FLEXHALER) 180 MCG/ACT AEPB inhaler Inhale 2 puffs into the lungs 2 times daily  Lesli Nair MD   Spacer/Aero-Holding [employer]Y 1 Device by Does not apply route daily  Lesli Nair MD   fluticasone (FLONASE) 50 MCG/ACT nasal spray 2 sprays by Each Nostril route daily  Lesli Nair MD   clobetasol (TEMOVATE) 0.05 % ointment Apply topically 2 times daily. Lesli Nair MD   acetaminophen (TYLENOL) 500 MG tablet Take 2 tablets by mouth every 6 hours as needed for Pain  Magen Porter MD   omeprazole (PRILOSEC) 20 MG delayed release capsule Take 1 capsule by mouth 2 times daily  Lesli Nair MD   clindamycin (CLINDAGEL) 1 % gel Apply topically daily Apply topically 2 times daily.   Historical Provider, MD   Adapalene-Benzoyl Peroxide (EPIDUO FORTE) 0.3-2.5 % GEL Apply topically daily  Historical Provider, MD   albuterol sulfate  (90 Base) MCG/ACT inhaler Inhale 2 puffs into the lungs every 6 hours as needed for Wheezing  Lesli Nair MD       Social History     Tobacco Use    Smoking status: Never Smoker    Smokeless tobacco: Never Used   Substance Use Topics    Alcohol use: No    Drug use: No        Past Medical History:   Diagnosis Date    Acne teeanger    Asthma     Dx as a child        PHYSICAL EXAMINATION:    Vital Signs: (As obtained by patient/caregiver or practitioner observation)    Heart rate-  87   Temperature-  97.4  Pulse oximetry- 99% Constitutional: [x] Appears well-developed and well-nourished [x] No apparent distress        Mental status  [x] Alert and awake  [x] Oriented to person/place/time [x]Able to follow commands      Eyes:  EOM    [x]  Normal    Sclera  [x]  Normal      HENT:   [x] Normocephalic, atraumatic. [x] Mouth/Throat: Mucous membranes are moist.     External Ears [x] Normal      Neck: [x] No visualized mass     Pulmonary/Chest: [x] Respiratory effort normal.  [x] No visualized signs of difficulty breathing or respiratory distress             Musculoskeletal:  [x] Normal range of motion of neck       Neurological:        [x] No Facial Asymmetry (Cranial nerve 7 motor function) (limited exam to video visit)             Skin:        [x] No significant exanthematous lesions or discoloration noted on facial skin                 Psychiatric:       [x] Normal Affect       ASSESSMENT/PLAN:    1. COVID-19 virus infection  Continue isolation due to fever in the last 24 hours    2. Pneumonia due to COVID-19 virus  Finish all medications as prescribed at the hospital.    3. Chronic deep vein thrombosis (DVT) of left lower extremity, unspecified vein (HCC)  Continue Eliquis. We will do outpatient work-up and repeat Venous Doppler ultrasound left lower extremity in 3 months      Return in about 4 days (around 12/19/2020). Azam Zhu is a 36 y.o. female being evaluated by a Virtual Visit (video visit) encounter to address concerns as mentioned above. A caregiver was present when appropriate. Due to this being a TeleHealth encounter (During LPCYE-70 public health emergency), evaluation of the following organ systems was limited: Vitals/Constitutional/EENT/Resp/CV/GI//MS/Neuro/Skin/Heme-Lymph-Imm. Pursuant to the emergency declaration under the [address] [place] [address] and the [employer] Act, this Virtual Visit was conducted with patient's (and/or legal guardian's) consent, to reduce the patient's risk of exposure to COVID-19 and provide necessary medical care. The patient (and/or legal guardian) has also been advised to contact this office for worsening conditions or problems, and seek emergency medical treatment and/or call 911 if deemed necessary. Patient identification was verified at the start of the visit: Yes    Total time spent on this encounter: Not billed by time    Services were provided through a video synchronous discussion virtually to substitute for in-person clinic visit. Patient and provider were located at their individual homes. --Alondra Virk MD on 12/28/2020 at 10:21 AM    An electronic signature was used to authenticate this note.

## 2020-12-16 LAB — BLOOD CULTURE, ROUTINE: NORMAL

## 2020-12-16 RX ORDER — BENZONATATE 100 MG/1
100-200 CAPSULE ORAL 3 TIMES DAILY PRN
Qty: 60 CAPSULE | Refills: 0 | Status: SHIPPED | OUTPATIENT
Start: 2020-12-16 | End: 2020-12-23

## 2020-12-17 NOTE — PROGRESS NOTES
CLINICAL PHARMACY NOTE: MEDS TO [address] Select Patient?: Yes  Total # of Prescriptions Filled: 7   The following medications were delivered to the patient:  Vitamin D3 25mcg  Azithromycin 500mg  Elquis 5mg  Potassium 20meq  Vitamin C 100mg  Augmentin 500-125mg  Culturelle  Total # of Interventions Completed: 2  Time Spent (min): 30    Additional Documentation:

## 2020-12-18 ENCOUNTER — VIRTUAL VISIT (OUTPATIENT)
Dept: FAMILY MEDICINE CLINIC | Age: 40
End: 2020-12-18

## 2020-12-18 PROCEDURE — 99213 OFFICE O/P EST LOW 20 MIN: CPT | Performed by: FAMILY MEDICINE

## 2020-12-18 NOTE — PROGRESS NOTES
2020    TELEHEALTH EVALUATION -- Audio/Visual (During [facility]LC-24 public health emergency)    HPI:    Yao Rivera (:  1980) has requested an audio/video evaluation for the following concern(s): Follow-up Covid and chronic DVT    Overall she is feeling better, but the last 2 nights she has had chills that woke her at night. She check her temperature at the middle of the night and has been 98.2, 97.2. She is a still coughing and taking Tessalon Perles. The cough is hacking and dry and coming access and when she finished she feels short of breath. Her oxygenation has been good to 98%. During her hospitalization she was found to have a potassium of 3.2 and she was given oral pills that are really big and she has not been able to take them. She would like to have her potassium repeated    During her hospitalization the ultrasound of the left lower extremity showed partial compressibility with diminished flow in the proximal aspect of the left popliteal vein. It was felt that may be due to chronic DVT. Her D-dimer during this hospitalization was 253.00. She was started on Eliquis 10 mg twice a day for 5 days then 5 mg twice a day. Review of Systems:  Negative for fever positive for chills. Positive for dry hacking cough. Negative for shortness of breath. Negative for nausea or vomiting. Prior to Visit Medications    Medication Sig Taking?  Authorizing Provider   benzonatate (TESSALON) 100 MG capsule Take 1-2 capsules by mouth 3 times daily as needed for Cough  Noel Beck MD   lactobacillus (CULTURELLE) capsule Take 1 capsule by mouth daily (with breakfast)  [address], MD   vitamin C (VITAMIN C) 1000 MG tablet Take 1 tablet by mouth daily  [address], MD   Cholecalciferol (VITAMIN D) 25 MCG TABS Take 1 tablet by mouth daily  [address], MD zinc sulfate (ZINCATE) 220 (50 Zn) MG capsule Take 1 capsule by mouth 2 times daily  Tyrese Hill MD   potassium chloride (KLOR-CON M) 20 MEQ extended release tablet Take 1 tablet by mouth 2 times daily for 3 days  Tyrese Hill MD   apixaban starter pack (ELIQUIS DVT/PE STARTER PACK) 5 MG TBPK tablet Take 1 tablet by mouth See  Dylan Sanchez MD   budesonide (PULMICORT FLEXHALER) 180 MCG/ACT AEPB inhaler Inhale 2 puffs into the lungs 2 times daily  Renea Dee MD   Spacer/Aero-Holding Chambers VALERIY 1 Device by Does not apply route daily  Renea Dee MD   fluticasone (FLONASE) 50 MCG/ACT nasal spray 2 sprays by Each Nostril route daily  Renea Dee MD   guaiFENesin (MUCINEX) 600 MG extended release tablet Take 2 tablets by mouth 2 times daily for 10 days  Renea Dee MD   clobetasol (TEMOVATE) 0.05 % ointment Apply topically 2 times daily. Renea Dee MD   acetaminophen (TYLENOL) 500 MG tablet Take 2 tablets by mouth every 6 hours as needed for Pain  Leti Kramer MD   omeprazole (PRILOSEC) 20 MG delayed release capsule Take 1 capsule by mouth 2 times daily  Renea Dee MD   clindamycin (CLINDAGEL) 1 % gel Apply topically daily Apply topically 2 times daily.   Historical Provider, MD   Adapalene-Benzoyl Peroxide (EPIDUO FORTE) 0.3-2.5 % GEL Apply topically daily  Historical Provider, MD   albuterol sulfate  (90 Base) MCG/ACT inhaler Inhale 2 puffs into the lungs every 6 hours as needed for Wheezing  Renea Dee MD       Social History     Tobacco Use    Smoking status: Never Smoker    Smokeless tobacco: Never Used   Substance Use Topics    Alcohol use: No    Drug use: No        Past Medical History:   Diagnosis Date    Acne audelia    Asthma     Dx as a child        PHYSICAL EXAMINATION:    Vital Signs: (As obtained by patient/caregiver or practitioner observation) Prisca Aquino is a 44 y.o. female being evaluated by a Virtual Visit (video visit) encounter to address concerns as mentioned above. A caregiver was present when appropriate. Due to this being a TeleHealth encounter (During [facility]- public [facility] emergency), evaluation of the following organ systems was limited: Vitals/Constitutional/EENT/Resp/CV/GI//MS/Neuro/Skin/Heme-Lymph-Imm. Pursuant to the emergency declaration under the [address] [place] [address] and the [employer] and [employer] Act, this Virtual Visit was conducted with patient's (and/or legal guardian's) consent, to reduce the patient's risk of exposure to COVID-19 and provide necessary medical care. The patient (and/or legal guardian) has also been advised to contact this office for worsening conditions or problems, and seek emergency medical treatment and/or call 911 if deemed necessary. Patient identification was verified at the start of the visit: Yes    Total time spent on this encounter: Not billed by time    Services were provided through a video synchronous discussion virtually to substitute for in-person clinic visit. Patient and provider were located at their individual homes. --Flower Rosa MD on 12/18/2020 at 2:40 PM    An electronic signature was used to authenticate this note.

## 2020-12-22 ENCOUNTER — VIRTUAL VISIT (OUTPATIENT)
Dept: FAMILY MEDICINE CLINIC | Age: 40
End: 2020-12-22

## 2020-12-22 PROCEDURE — 99213 OFFICE O/P EST LOW 20 MIN: CPT | Performed by: FAMILY MEDICINE

## 2020-12-22 NOTE — PROGRESS NOTES
2020    TELEHEALTH EVALUATION -- Audio/Visual (During EQQPC-15 public health emergency)    HPI:    Maik Shea (:  1980) has requested an audio/video evaluation for the following concern(s): covid 19    She is feeling better, afebrile for 4 days now. She still having cough, but is much improved. Denies any chills, nausea, vomiting, abdominal pain, congestion, drainage. Last night she woke up at night with pain in her chest which was reproducible upon palpation of the chest wall. She believes may be related to coughing    Review of Systems:  Denies fever or chills. Denies congestion or drainage. Denies nausea or vomiting. Positive for cough. Positive for chest wall tenderness. Prior to Visit Medications    Medication Sig Taking?  Authorizing Provider   benzonatate (TESSALON) 100 MG capsule Take 1-2 capsules by mouth 3 times daily as needed for Cough  Erick Mooney MD   lactobacillus (CULTURELLE) capsule Take 1 capsule by mouth daily (with breakfast)  Joanna Roland MD   vitamin C (VITAMIN C) 1000 MG tablet Take 1 tablet by mouth daily  Joanna Roland MD   Cholecalciferol (VITAMIN D) 25 MCG TABS Take 1 tablet by mouth daily  Joanna Roland MD   zinc sulfate (ZINCATE) 220 (50 Zn) MG capsule Take 1 capsule by mouth 2 times daily  Joanna Roland MD   potassium chloride (KLOR-CON M) 20 MEQ extended release tablet Take 1 tablet by mouth 2 times daily for 3 days  Joanna Rolnad MD   apixaban starter pack (ELIQUIS DVT/PE STARTER PACK) 5 MG TBPK tablet Take 1 tablet by mouth See [address] MD Laura   budesonide (PULMICORT FLEXHALER) 180 MCG/ACT AEPB inhaler Inhale 2 puffs into the lungs 2 times daily  Erick Mooney MD   Spacer/Aero-Holding Chambers VALERIY 1 Device by Does not apply route daily  Erick Mooney MD fluticasone (FLONASE) 50 MCG/ACT nasal spray 2 sprays by Each Nostril route daily  Meet Maier MD   clobetasol (TEMOVATE) 0.05 % ointment Apply topically 2 times daily. Meet Maier MD   acetaminophen (TYLENOL) 500 MG tablet Take 2 tablets by mouth every 6 hours as needed for Pain  Joshua Harrison MD   omeprazole (PRILOSEC) 20 MG delayed release capsule Take 1 capsule by mouth 2 times daily  Meet Maier MD   clindamycin (CLINDAGEL) 1 % gel Apply topically daily Apply topically 2 times daily. Historical Provider, MD   Adapalene-Benzoyl Peroxide (EPIDUO FORTE) 0.3-2.5 % GEL Apply topically daily  Historical Provider, MD   albuterol sulfate  (90 Base) MCG/ACT inhaler Inhale 2 puffs into the lungs every 6 hours as needed for Wheezing  Meet Maier MD       Social History     Tobacco Use    Smoking status: Never Smoker    Smokeless tobacco: Never Used   Substance Use Topics    Alcohol use: No    Drug use: No        Past Medical History:   Diagnosis Date    Acne teeanger    Asthma     Dx as a child        PHYSICAL EXAMINATION:      Heart rate- 92    Temperature- 98.0 Pulse oximetry- 99%    Constitutional: [x] Appears well-developed and well-nourished [x] No apparent distress        Mental status  [x] Alert and awake  [x] Oriented to person/place/time [x]Able to follow commands      Eyes:  EOM    [x]  Normal        HENT:   [x] Normocephalic, atraumatic. [x] Mouth/Throat: Mucous membranes are moist.     External Ears [x] Normal      Neck: [x] No visualized mass     Pulmonary/Chest: [x] Respiratory effort normal.  [x] No visualized signs of difficulty breathing or respiratory distress    Neurological:        [x] No Facial Asymmetry (Cranial nerve 7 motor function) (limited exam to video visit)              Psychiatric:       [x] Normal Affect       ASSESSMENT/PLAN:    1. COVID-19 virus infection  Is still with residual cough. May continue Mucinex twice a day and [employer]. Chest wall pain probably related to the coughing. Will observe. She will call me if there is any worsening or nonimprovement. She can return to work tomorrow. Return if symptoms worsen or fail to improve. Hernando Ramirez is a 44 y.o. female being evaluated by a Virtual Visit (video visit) encounter to address concerns as mentioned above. A caregiver was present when appropriate. Due to this being a TeleHealth encounter ([facility]), evaluation of the following organ systems was limited: Vitals/Constitutional/EENT/Resp/CV/GI//MS/Neuro/Skin/Heme-Lymph-Imm. Pursuant to the emergency declaration under the [address] [place] [address], [address] [place] [address] authority and the [employer] and [employer] Act, this Virtual Visit was conducted with patient's (and/or legal guardian's) consent, to reduce the patient's risk of exposure to COVID-19 and provide necessary medical care. The patient (and/or legal guardian) has also been advised to contact this office for worsening conditions or problems, and seek emergency medical treatment and/or call 911 if deemed necessary. Patient identification was verified at the start of the visit: Yes    Total time spent on this encounter: Not billed by time    Services were provided through a video synchronous discussion virtually to substitute for in-person clinic visit. Patient and provider were located at their individual homes. --Luz Marina Calderon MD on 12/22/2020 at 4:35 PM    An electronic signature was used to authenticate this note.

## 2020-12-23 ENCOUNTER — CARE COORDINATION (OUTPATIENT)
Dept: OTHER | Facility: CLINIC | Age: 40
End: 2020-12-23

## 2020-12-23 NOTE — CARE COORDINATION
[facility] Transitions Follow Up Call    2020    Patient: Nellie Villarrealteena RODRIGUEZ [address]  Patient : 1980   MRN: [id number]  Reason for Admission: COVID +  Discharge Date: 20 RARS: No data recorded       Patient has been provided the following resources and education related to COVID-19:                         Signs, symptoms and red flags related to COVID-19            CDC exposure and quarantine guidelines            Conduit exposure contact - [phone]            Contact for their local Department of Health            Occupational health deptartment                 Patient currently reports that the following symptoms have improved:  fever, fatigue, pain or aching joints, cough, shortness of breath and fast heart rate. Pt has completed her PCP follow up appts. She has spoken with Occupational health and Associate services in regards to her  [address] STD. She continues to have fatigue, but was expecting that. She denies any other ongoing symptoms at this time. She has been cleared to RTW, but she already had off until 20. She will RTW at that time. No further outreach scheduled with this CTN/ACM. Episode of Care resolved. Patient has this CTN/ACM contact information if future needs arise. Care Transitions Subsequent and Final Call    Schedule Follow Up Appointment with PCP: Completed  Subsequent and Final Calls  Do you have any ongoing symptoms?: Yes  Onset of Patient-reported symptoms: Other  Patient-reported symptoms: Fatigue  Interventions for patient-reported symptoms: Other  Have your medications changed?: No  Do you have any questions related to your medications?: No  Do you currently have any active services?: No  Do you have any needs or concerns that I can assist you with?: No  Identified Barriers: None  Care Transitions Interventions  No Identified Needs     Other Services: Declined (Comment: Declined LOOP)   Other Interventions:            Follow Up  No future

## 2021-02-26 ENCOUNTER — PATIENT MESSAGE (OUTPATIENT)
Dept: FAMILY MEDICINE CLINIC | Age: 41
End: 2021-02-26

## 2021-02-26 NOTE — TELEPHONE ENCOUNTER
From: Zeynep Navarro  To: Braxton Moy MD  Sent: 2/26/2021 5:57 AM EST  Subject: Prescription Question    Good morning Dr. Jenny Fleischer. Could I please request a refill for my Eliquis prescription? Prescribed to me during my covid-19 diagnosis back in Dec last year. I only 5 days worth of medication remaining. Thank you.

## 2021-02-26 NOTE — TELEPHONE ENCOUNTER
Please approve or deny     Last Visit Date:  12/22/2020   Virtual visit with Veronica Camarillo    Next Visit Date:    3/3/2021 with Veronica Camarillo

## 2021-03-02 ENCOUNTER — NURSE ONLY (OUTPATIENT)
Dept: LAB | Age: 41
End: 2021-03-02

## 2021-03-02 DIAGNOSIS — I82.512 CHRONIC DEEP VEIN THROMBOSIS (DVT) OF FEMORAL VEIN OF LEFT LOWER EXTREMITY (HCC): ICD-10-CM

## 2021-03-02 DIAGNOSIS — U07.1 COVID-19 VIRUS INFECTION: ICD-10-CM

## 2021-03-02 DIAGNOSIS — R73.09 ELEVATED GLUCOSE: Primary | ICD-10-CM

## 2021-03-02 DIAGNOSIS — E87.6 HYPOKALEMIA: ICD-10-CM

## 2021-03-02 LAB
ANION GAP SERPL CALCULATED.3IONS-SCNC: 15 MEQ/L (ref 8–16)
BUN BLDV-MCNC: 16 MG/DL (ref 7–22)
CALCIUM SERPL-MCNC: 9.5 MG/DL (ref 8.5–10.5)
CHLORIDE BLD-SCNC: 101 MEQ/L (ref 98–111)
CO2: 23 MEQ/L (ref 23–33)
CREAT SERPL-MCNC: 0.7 MG/DL (ref 0.4–1.2)
GFR SERPL CREATININE-BSD FRML MDRD: > 90 ML/MIN/1.73M2
GLUCOSE BLD-MCNC: 142 MG/DL (ref 70–108)
POTASSIUM SERPL-SCNC: 3.5 MEQ/L (ref 3.5–5.2)
SODIUM BLD-SCNC: 139 MEQ/L (ref 135–145)

## 2021-03-03 ENCOUNTER — OFFICE VISIT (OUTPATIENT)
Dept: FAMILY MEDICINE CLINIC | Age: 41
End: 2021-03-03
Payer: COMMERCIAL

## 2021-03-03 VITALS
BODY MASS INDEX: 21.79 KG/M2 | HEIGHT: 61 IN | WEIGHT: 115.4 LBS | RESPIRATION RATE: 14 BRPM | TEMPERATURE: 98.2 F | DIASTOLIC BLOOD PRESSURE: 82 MMHG | SYSTOLIC BLOOD PRESSURE: 113 MMHG

## 2021-03-03 DIAGNOSIS — Z13.220 LIPID SCREENING: ICD-10-CM

## 2021-03-03 DIAGNOSIS — Z00.00 WELLNESS EXAMINATION: Primary | ICD-10-CM

## 2021-03-03 DIAGNOSIS — Z13.0 SCREENING FOR DEFICIENCY ANEMIA: ICD-10-CM

## 2021-03-03 DIAGNOSIS — Z13.1 SCREENING FOR DIABETES MELLITUS: ICD-10-CM

## 2021-03-03 PROCEDURE — 99396 PREV VISIT EST AGE 40-64: CPT | Performed by: FAMILY MEDICINE

## 2021-03-03 RX ORDER — FERROUS SULFATE 325(65) MG
325 TABLET ORAL NIGHTLY
COMMUNITY

## 2021-03-03 RX ORDER — M-VIT,TX,IRON,MINS/CALC/FOLIC 27MG-0.4MG
1 TABLET ORAL DAILY
COMMUNITY

## 2021-03-03 NOTE — PROGRESS NOTES
[address] [place] [address] [address] [facility], [address] [facility]  Ph:   [phone]  Fax: [phone]    Provider:  Dr. Natasha Godfrey Visit    Patient:  Marshall Dunne  YOB: 1980      Visit Date:  3/3/2021    Subjective:  Review of Systems    Health Habits: With regard to her health habits, she eats 3 meals and 1 snacks per day. She does not exercise regularly:   Physical activities include: walking, 1 times per week, 30 minutes/day. She does take over the counter vitamins. She never had a blood transfusion or tattoo. She wears seatbelts while riding a car. She does not text or talk on the phone while driving. She performs all of her ADL's without problem. She is independent, she cooks, drives, bathes, and gets dressed without assistance. She is . She has 1 child. She does work. She works 84 hours a week. She is happy with her life. She rates her stress level a 4 in a scale 1-10. Health Maintenance   Topic Date Due    Hepatitis C screen  Never done    Varicella vaccine (1 of 2 - 2-dose childhood series) Never done    HIV screen  Never done    DTaP/Tdap/Td vaccine (1 - Tdap) Never done    Cervical cancer screen  Never done    Lipid screen  02/21/2025    Flu vaccine  Completed    Pneumococcal 0-64 years Vaccine  Completed    Hepatitis A vaccine  Aged Out    Hepatitis B vaccine  Aged Out    Hib vaccine  Aged Out    Meningococcal (ACWY) vaccine  Aged Out       She  reports that she has never smoked. She has never used smokeless tobacco. She reports that she does not drink alcohol or use drugs. .    Her last mammogram was, never had. Her last pap smear was 10  years and it was normal. Her last menstrual cycle was 1 month ago. She is sexually active. She has had the same sexual partner for the last 15 years. She does not perform regular breast self exams. [address] [place] [address] 59   [address] [facility], [address] [facility]  Ph:   [phone]  Fax: [phone]    Provider:  Dr. Karley Bojorquez NOTES     HPI:  Torey Hernandez is a 36y.o. year old female, who comes today for an annual wellness visit. Past Medical History:   Diagnosis Date    Acne teeanger    Asthma     Dx as a child        History reviewed. No pertinent surgical history.     Family History   Problem Relation Age of Onset    Elevated Lipids Mother 48    High Blood Pressure Mother 48    High Blood Pressure Father 48    Heart Attack Father 47        CAD    Elevated Lipids Father 48    Asthma Father     Asthma Sister         dx as a chlid   Georgianna Olszewski Asthma Brother         dx as chlid    Seizures Maternal Grandfather     High Blood Pressure Paternal Grandmother     Breast Cancer Paternal Aunt 52       Social History     Socioeconomic History    Marital status:      Spouse name: Emanual    Number of children: 1    Years of education: 16    Highest education level: Not on file   Occupational History    Occupation: physician   Social Needs    Financial resource strain: Not hard at all   [place] insecurity     Worry: Never true     Inability: Never true    Transportation needs     Medical: No     Non-medical: No   Tobacco Use    Smoking status: Never Smoker    Smokeless tobacco: Never Used   Substance and Sexual Activity    Alcohol use: No    Drug use: No    Sexual activity: Yes     Partners: Male     Comment: - 12 years    Lifestyle    Physical activity     Days per week: Not on file     Minutes per session: Not on file    Stress: Not on file   Relationships    Social connections     Talks on phone: Not on file     Gets together: Not on file     Attends Ep[employer] service: Not on file     Active member of club or organization: Not on file     Attends meetings of clubs or organizations: Not on file     Relationship status: Not on file    Intimate partner violence     Fear of current or ex partner: Not on file     Emotionally abused: Not on file Physically abused: Not on file     Forced sexual activity: Not on file   Other Topics Concern    Not on file   Social History Narrative    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 Objective:  Blood pressure 113/82, temperature 98.2 °F (36.8 °C), temperature source Infrared, resp. rate 14, height 5' 1\" (1.549 m), weight 115 lb 6.4 oz (52.3 kg). Physical Examination:   General Appearance - alert, well appearing, and in no distress and oriented to person, place, and time  Eyes - pupils equal and reactive, extraocular eye movements intact, sclera anicteric  Ears - bilateral TM's and external ear canals normal, hearing grossly normal bilaterally  Nose - normal and patent, no erythema, discharge or polyps  Mouth - mucous membranes moist, pharynx normal without lesions  Neck - supple, no significant adenopathy  Lymphatics - no palpable lymphadenopathy  Chest - clear to auscultation, no wheezes, rales or rhonchi, symmetric air entry  Heart - normal rate, regular rhythm, normal S1, S2, no murmurs, rubs, clicks or gallops  Abdomen - soft, nontender, nondistended, no masses or organomegaly  Neurological -  oriented, normal speech, no focal findings or movement disorder noted  Extremities -  no pedal edema, no clubbing or cyanosis  Skin - normal coloration and turgor, no rashes, no suspicious skin lesions noted    Assessment:   Diagnosis Orders   1. Wellness examination     2. Screening for deficiency anemia  CBC Auto Differential   3. Lipid screening  Lipid Panel   4.

## 2021-03-04 ENCOUNTER — IMMUNIZATION (OUTPATIENT)
Dept: PRIMARY CARE CLINIC | Age: 41
End: 2021-03-04
Payer: COMMERCIAL

## 2021-03-04 PROCEDURE — 91300 COVID-19, PFIZER VACCINE 30MCG/0.3ML DOSE: CPT | Performed by: PHARMACIST

## 2021-03-04 PROCEDURE — 0001A COVID-19, PFIZER VACCINE 30MCG/0.3ML DOSE: CPT | Performed by: PHARMACIST

## 2021-03-06 LAB
ANTITHROMBIN ACTIVITY: 116 % (ref 76–128)
PROTEIN C FUNCTIONAL: 125 % (ref 83–168)
PROTEIN S, FUNCTIONAL: 89 % (ref 57–131)

## 2021-03-08 LAB — MISC. #1 REFERENCE GROUP TEST: NORMAL

## 2021-03-09 ENCOUNTER — TELEPHONE (OUTPATIENT)
Dept: FAMILY MEDICINE CLINIC | Age: 41
End: 2021-03-09

## 2021-03-09 NOTE — TELEPHONE ENCOUNTER
----- Message from [facility], [address] sent at 3/8/2021  7:34 AM EST -----  Please Review

## 2021-03-09 NOTE — TELEPHONE ENCOUNTER
Left message on [employer] for pt to have her A1C rechecked. Informed pt to call the office if any questions.

## 2021-03-09 NOTE — PROGRESS NOTES
Medication(s) given during visit:        Patient instructed to remain in clinic for 20 minutes after injection and was advised to report any adverse reaction to me immediately.

## 2021-03-10 LAB — MISC. #1 REFERENCE GROUP TEST: NORMAL

## 2021-03-18 ENCOUNTER — HOSPITAL ENCOUNTER (OUTPATIENT)
Dept: INTERVENTIONAL RADIOLOGY/VASCULAR | Age: 41
Discharge: HOME | End: 2021-03-18
Payer: COMMERCIAL

## 2021-03-18 DIAGNOSIS — I82.512 CHRONIC DEEP VEIN THROMBOSIS (DVT) OF FEMORAL VEIN OF LEFT LOWER EXTREMITY (HCC): ICD-10-CM

## 2021-03-18 PROCEDURE — 93971 EXTREMITY STUDY: CPT

## 2021-03-19 ENCOUNTER — NURSE ONLY (OUTPATIENT)
Dept: LAB | Age: 41
End: 2021-03-19

## 2021-03-19 DIAGNOSIS — Z13.0 SCREENING FOR DEFICIENCY ANEMIA: ICD-10-CM

## 2021-03-19 DIAGNOSIS — Z13.220 LIPID SCREENING: ICD-10-CM

## 2021-03-19 DIAGNOSIS — Z13.1 SCREENING FOR DIABETES MELLITUS: ICD-10-CM

## 2021-03-19 LAB
AVERAGE GLUCOSE: 99 MG/DL (ref 70–126)
BASOPHILS # BLD: 2 %
BASOPHILS ABSOLUTE: 0.1 THOU/MM3 (ref 0–0.1)
CHOLESTEROL, TOTAL: 207 MG/DL (ref 100–199)
EOSINOPHIL # BLD: 3.7 %
EOSINOPHILS ABSOLUTE: 0.2 THOU/MM3 (ref 0–0.4)
ERYTHROCYTE [DISTWIDTH] IN BLOOD BY AUTOMATED COUNT: 13.8 % (ref 11.5–14.5)
ERYTHROCYTE [DISTWIDTH] IN BLOOD BY AUTOMATED COUNT: 46.9 FL (ref 35–45)
HBA1C MFR BLD: 5.3 % (ref 4.4–6.4)
HCT VFR BLD CALC: 43.1 % (ref 37–47)
HDLC SERPL-MCNC: 71 MG/DL
HEMOGLOBIN: 12.9 GM/DL (ref 12–16)
IMMATURE GRANS (ABS): 0.01 THOU/MM3 (ref 0–0.07)
IMMATURE GRANULOCYTES: 0.2 %
LDL CHOLESTEROL CALCULATED: 123 MG/DL
LYMPHOCYTES # BLD: 34.8 %
LYMPHOCYTES ABSOLUTE: 1.7 THOU/MM3 (ref 1–4.8)
MCH RBC QN AUTO: 27.6 PG (ref 26–33)
MCHC RBC AUTO-ENTMCNC: 29.9 GM/DL (ref 32.2–35.5)
MCV RBC AUTO: 92.3 FL (ref 81–99)
MONOCYTES # BLD: 11 %
MONOCYTES ABSOLUTE: 0.5 THOU/MM3 (ref 0.4–1.3)
NUCLEATED RED BLOOD CELLS: 0 /100 WBC
PLATELET # BLD: 336 THOU/MM3 (ref 130–400)
PMV BLD AUTO: 9.6 FL (ref 9.4–12.4)
RBC # BLD: 4.67 MILL/MM3 (ref 4.2–5.4)
SEG NEUTROPHILS: 48.3 %
SEGMENTED NEUTROPHILS ABSOLUTE COUNT: 2.4 THOU/MM3 (ref 1.8–7.7)
TRIGL SERPL-MCNC: 65 MG/DL (ref 0–199)
WBC # BLD: 4.9 THOU/MM3 (ref 4.8–10.8)

## 2021-03-25 ENCOUNTER — IMMUNIZATION (OUTPATIENT)
Dept: PRIMARY CARE CLINIC | Age: 41
End: 2021-03-25
Payer: COMMERCIAL

## 2021-03-25 ENCOUNTER — TELEPHONE (OUTPATIENT)
Dept: FAMILY MEDICINE CLINIC | Age: 41
End: 2021-03-25

## 2021-03-25 PROCEDURE — 0002A COVID-19, PFIZER VACCINE 30MCG/0.3ML DOSE: CPT

## 2021-03-25 PROCEDURE — 91300 COVID-19, PFIZER VACCINE 30MCG/0.3ML DOSE: CPT

## 2021-03-25 NOTE — TELEPHONE ENCOUNTER
Godfrey Garner MD   3/24/2021  5:40 PM EDT      Her total cholesterol was 207 which was an increase from previous one a year ago when he was 179.  But her HDL was 71 4 points above a year ago when he was 67.  Triglycerides are 65 and .  Also an increase from last year when he was 103.  Hemoglobin A1c was 5.3.  WBC within normal limits

## 2021-06-30 RX ORDER — APIXABAN 5 MG (74)
KIT ORAL
Qty: 74 TABLET | Refills: 2 | Status: SHIPPED | OUTPATIENT
Start: 2021-06-30

## 2021-07-08 ENCOUNTER — OFFICE VISIT (OUTPATIENT)
Dept: FAMILY MEDICINE CLINIC | Age: 41
End: 2021-07-08
Payer: COMMERCIAL

## 2021-07-08 VITALS
SYSTOLIC BLOOD PRESSURE: 101 MMHG | WEIGHT: 118 LBS | TEMPERATURE: 97.5 F | BODY MASS INDEX: 22.3 KG/M2 | HEART RATE: 76 BPM | DIASTOLIC BLOOD PRESSURE: 66 MMHG

## 2021-07-08 DIAGNOSIS — Z86.718 HX OF DEEP VENOUS THROMBOSIS: ICD-10-CM

## 2021-07-08 DIAGNOSIS — Z78.9 HISTORY OF RECENT TRAVEL: ICD-10-CM

## 2021-07-08 DIAGNOSIS — Z11.1 SCREENING FOR TUBERCULOSIS: ICD-10-CM

## 2021-07-08 DIAGNOSIS — M79.605 PAIN IN LEFT LEG: Primary | ICD-10-CM

## 2021-07-08 PROCEDURE — 99213 OFFICE O/P EST LOW 20 MIN: CPT | Performed by: FAMILY MEDICINE

## 2021-07-08 SDOH — ECONOMIC STABILITY: FOOD INSECURITY: WITHIN THE PAST 12 MONTHS, THE FOOD YOU BOUGHT JUST DIDN'T LAST AND YOU DIDN'T HAVE MONEY TO GET MORE.: NEVER TRUE

## 2021-07-08 SDOH — ECONOMIC STABILITY: FOOD INSECURITY: WITHIN THE PAST 12 MONTHS, YOU WORRIED THAT YOUR FOOD WOULD RUN OUT BEFORE YOU GOT MONEY TO BUY MORE.: NEVER TRUE

## 2021-07-08 ASSESSMENT — PATIENT HEALTH QUESTIONNAIRE - PHQ9
1. LITTLE INTEREST OR PLEASURE IN DOING THINGS: 0
2. FEELING DOWN, DEPRESSED OR HOPELESS: 0
SUM OF ALL RESPONSES TO PHQ QUESTIONS 1-9: 0
DEPRESSION UNABLE TO ASSESS: PT REFUSES
SUM OF ALL RESPONSES TO PHQ9 QUESTIONS 1 & 2: 0

## 2021-07-08 ASSESSMENT — SOCIAL DETERMINANTS OF HEALTH (SDOH): HOW HARD IS IT FOR YOU TO PAY FOR THE VERY BASICS LIKE FOOD, HOUSING, MEDICAL CARE, AND HEATING?: NOT HARD AT ALL

## 2021-07-08 NOTE — PROGRESS NOTES
[address]  [address] [place], [address]  Ph:   [phone]  Fax: [phone]    Provider:  Dr. Onelia Iniguez    Patient:  Prabhakar Rodney  YOB: 1980      Visit Date:  7/8/2021     Reason For Visit:   Chief Complaint   Patient presents with    1 Month Follow-Up     follow up doppler        Tony Fowler is a 36 y.o. female who comes today to the office for follow up of DVT and because of pain of the left lower extremities. On June 4 she started a [place] from [place] to [place] and back. She returned home on June 27. The last 3 days of the trip, when returning home she spent 12 hours a day in the car. Few days after arrival to home she started feeling pain in her cough first, then the pain including the lateral aspect of the lower extremity and now it also involve the medial aspect of the leg. She does not have swelling, just pain. She has just stopped Eliquis after 6 months of treatment after a DVT of the left lower extremity found after she got Covid. She restarted taking Eliquis few days ago, concern that this may be another DVT. Coagulable state work-up was all negative. Significant Past Medical History:    Past Medical History:   Diagnosis Date    Acne teeanger    Asthma     Dx as a child            Allergies:  has No Known Allergies.     Medications:   Current Outpatient Medications   Medication Sig Dispense Refill    ELIQUIS DVT/PE STARTER PACK 5 MG TBPK tablet TAKE 1 TABLET BY MOUTH AS DIRECTED 74 tablet 2    Multiple Vitamins-Minerals (THERAPEUTIC MULTIVITAMIN-MINERALS) tablet Take 1 tablet by mouth daily      ferrous sulfate (IRON 325) 325 (65 Fe) MG tablet Take 325 mg by mouth nightly      lactobacillus (CULTURELLE) capsule Take 1 capsule by mouth daily (with breakfast) 30 capsule 0    vitamin C (VITAMIN C) 1000 MG tablet Take 1 tablet by mouth daily 30 tablet 3    Cholecalciferol (VITAMIN D) 25 MCG TABS Take 1 tablet by mouth daily 60 tablet 0    zinc sulfate (ZINCATE) 220 (50 Zn) MG capsule Take 1 capsule by mouth 2 times daily 30 capsule 3    budesonide (PULMICORT FLEXHALER) 180 MCG/ACT AEPB inhaler Inhale 2 puffs into the lungs 2 times daily 1 each 1    Spacer/Aero-Holding Chambers VALERIY 1 Device by Does not apply route daily 1 Device 0    fluticasone (FLONASE) 50 MCG/ACT nasal spray 2 sprays by Each Nostril route daily 1 Bottle 0    clobetasol (TEMOVATE) 0.05 % ointment Apply topically 2 times daily. 45 g 0    acetaminophen (TYLENOL) 500 MG tablet Take 2 tablets by mouth every 6 hours as needed for Pain 20 tablet 0    omeprazole (PRILOSEC) 20 MG delayed release capsule Take 1 capsule by mouth 2 times daily 30 capsule 3    clindamycin (CLINDAGEL) 1 % gel Apply topically daily Apply topically 2 times daily.  Adapalene-Benzoyl Peroxide (EPIDUO FORTE) 0.3-2.5 % GEL Apply topically daily      albuterol sulfate  (90 Base) MCG/ACT inhaler Inhale 2 puffs into the lungs every 6 hours as needed for Wheezing 3 Inhaler 1    potassium chloride (KLOR-CON M) 20 MEQ extended release tablet Take 1 tablet by mouth 2 times daily for 3 days 6 tablet 0     No current facility-administered medications for this visit.        Review of systems:  Review of Systems - History obtained from chart review and the patient  General ROS: negative for - chills, fatigue or fever  Respiratory ROS: negative for - cough,  shortness of breath or wheezing  Cardiovascular ROS: negative for - chest pain or edema  Musculoskeletal ROS: Positive for left leg pain  Neurological ROS: negative for - dizziness  Dermatological ROS: negative for - rash    Physical Examination:  /66 (Site: Right Upper Arm, Position: Sitting, Cuff Size: Medium Adult)   Pulse 76   Temp 97.5 °F (36.4 °C) (Temporal)   Wt 118 lb (53.5 kg)   BMI 22.30 kg/m²     General-  Alert and oriented x 3, NAD  HEENT: NC, AT  Mouth: moist mucosas  Chest - clear to auscultation  Heart -normal rate, regular rhythm. Extremities -there is no swelling, redness or rashes. She complains of tenderness in the calf on both sides of the leg    Impression:   Diagnosis Orders   1. Pain in left leg  VL DUP LOWER EXTREMITY VENOUS LEFT   2. History of recent travel  VL DUP LOWER EXTREMITY VENOUS LEFT   3. Hx of deep venous thrombosis  VL DUP LOWER EXTREMITY VENOUS LEFT   4. Screening for tuberculosis  Quantiferon (R) TB Gold, (Incubated)       Plan:  Orders Placed This Encounter   Procedures    Quantiferon (R) TB Gold, (Incubated)     Standing Status:   Future     Standing Expiration Date:   7/8/2022    VL DUP LOWER EXTREMITY VENOUS LEFT     Standing Status:   Future     Standing Expiration Date:   7/8/2022     No orders of the defined types were placed in this encounter. Follow Up:  Return if symptoms worsen or fail to improve.     Mushtaq Dupont MD

## 2021-07-22 ENCOUNTER — HOSPITAL ENCOUNTER (OUTPATIENT)
Dept: INTERVENTIONAL RADIOLOGY/VASCULAR | Age: 41
Discharge: HOME | End: 2021-07-22
Payer: COMMERCIAL

## 2021-07-22 DIAGNOSIS — M79.605 PAIN IN LEFT LEG: ICD-10-CM

## 2021-07-22 DIAGNOSIS — Z86.718 HX OF DEEP VENOUS THROMBOSIS: ICD-10-CM

## 2021-07-22 DIAGNOSIS — Z78.9 HISTORY OF RECENT TRAVEL: ICD-10-CM

## 2021-07-22 PROCEDURE — 93971 EXTREMITY STUDY: CPT

## 2021-07-23 ENCOUNTER — TELEPHONE (OUTPATIENT)
Dept: FAMILY MEDICINE CLINIC | Age: 41
End: 2021-07-23

## 2021-08-19 RX ORDER — ALBUTEROL SULFATE 90 UG/1
2 AEROSOL, METERED RESPIRATORY (INHALATION) EVERY 6 HOURS PRN
Qty: 3 INHALER | Refills: 1 | Status: SHIPPED | OUTPATIENT
Start: 2021-08-19

## 2021-08-19 RX ORDER — HYDROQUINONE 40 MG/G
CREAM TOPICAL
Qty: 28.35 G | Refills: 0 | Status: SHIPPED | OUTPATIENT
Start: 2021-08-19

## 2021-08-23 ENCOUNTER — NURSE ONLY (OUTPATIENT)
Dept: LAB | Age: 41
End: 2021-08-23

## 2021-08-23 DIAGNOSIS — Z11.1 SCREENING FOR TUBERCULOSIS: ICD-10-CM

## 2021-08-25 LAB
QUANTI TB GOLD PLUS: NEGATIVE
QUANTI TB1 MINUS NIL: 0 IU/ML (ref 0–0.34)
QUANTI TB2 MINUS NIL: 0 IU/ML (ref 0–0.34)
QUANTIFERON MITOGEN MINUS NIL: > 10 IU/ML
QUANTIFERON NIL: 0.05 IU/ML